# Patient Record
Sex: FEMALE | Race: BLACK OR AFRICAN AMERICAN | NOT HISPANIC OR LATINO | Employment: FULL TIME | URBAN - METROPOLITAN AREA
[De-identification: names, ages, dates, MRNs, and addresses within clinical notes are randomized per-mention and may not be internally consistent; named-entity substitution may affect disease eponyms.]

---

## 2024-07-10 ENCOUNTER — APPOINTMENT (OUTPATIENT)
Dept: RADIOLOGY | Facility: HOSPITAL | Age: 34
End: 2024-07-10

## 2024-07-10 ENCOUNTER — HOSPITAL ENCOUNTER (EMERGENCY)
Facility: HOSPITAL | Age: 34
Discharge: HOME | End: 2024-07-10
Attending: STUDENT IN AN ORGANIZED HEALTH CARE EDUCATION/TRAINING PROGRAM

## 2024-07-10 VITALS
HEART RATE: 71 BPM | WEIGHT: 200.84 LBS | RESPIRATION RATE: 18 BRPM | SYSTOLIC BLOOD PRESSURE: 125 MMHG | TEMPERATURE: 98.2 F | OXYGEN SATURATION: 98 % | DIASTOLIC BLOOD PRESSURE: 78 MMHG

## 2024-07-10 DIAGNOSIS — R10.84 ABDOMINAL PAIN, GENERALIZED: ICD-10-CM

## 2024-07-10 DIAGNOSIS — O21.9 NAUSEA AND VOMITING IN PREGNANCY (HHS-HCC): Primary | ICD-10-CM

## 2024-07-10 DIAGNOSIS — N39.0 ACUTE UTI: ICD-10-CM

## 2024-07-10 LAB
ALBUMIN SERPL BCP-MCNC: 4.9 G/DL (ref 3.4–5)
ALP SERPL-CCNC: 92 U/L (ref 33–110)
ALT SERPL W P-5'-P-CCNC: 14 U/L (ref 7–45)
ANION GAP SERPL CALC-SCNC: 16 MMOL/L (ref 10–20)
APPEARANCE UR: CLEAR
AST SERPL W P-5'-P-CCNC: 33 U/L (ref 9–39)
B-HCG SERPL-ACNC: ABNORMAL MIU/ML
BACTERIA #/AREA URNS AUTO: ABNORMAL /HPF
BASOPHILS # BLD AUTO: 0.04 X10*3/UL (ref 0–0.1)
BASOPHILS NFR BLD AUTO: 0.2 %
BILIRUB SERPL-MCNC: 0.7 MG/DL (ref 0–1.2)
BILIRUB UR STRIP.AUTO-MCNC: NEGATIVE MG/DL
BUN SERPL-MCNC: 11 MG/DL (ref 6–23)
CALCIUM SERPL-MCNC: 9.5 MG/DL (ref 8.6–10.3)
CHLORIDE SERPL-SCNC: 98 MMOL/L (ref 98–107)
CO2 SERPL-SCNC: 24 MMOL/L (ref 21–32)
COLOR UR: YELLOW
CREAT SERPL-MCNC: 0.63 MG/DL (ref 0.5–1.05)
EGFRCR SERPLBLD CKD-EPI 2021: >90 ML/MIN/1.73M*2
EOSINOPHIL # BLD AUTO: 0.04 X10*3/UL (ref 0–0.7)
EOSINOPHIL NFR BLD AUTO: 0.2 %
ERYTHROCYTE [DISTWIDTH] IN BLOOD BY AUTOMATED COUNT: 14.1 % (ref 11.5–14.5)
GLUCOSE SERPL-MCNC: 84 MG/DL (ref 74–99)
GLUCOSE UR STRIP.AUTO-MCNC: ABNORMAL MG/DL
HCG UR QL IA.RAPID: POSITIVE
HCT VFR BLD AUTO: 45 % (ref 36–46)
HGB BLD-MCNC: 14.6 G/DL (ref 12–16)
IMM GRANULOCYTES # BLD AUTO: 0.09 X10*3/UL (ref 0–0.7)
IMM GRANULOCYTES NFR BLD AUTO: 0.5 % (ref 0–0.9)
KETONES UR STRIP.AUTO-MCNC: ABNORMAL MG/DL
LEUKOCYTE ESTERASE UR QL STRIP.AUTO: ABNORMAL
LYMPHOCYTES # BLD AUTO: 3.06 X10*3/UL (ref 1.2–4.8)
LYMPHOCYTES NFR BLD AUTO: 16.2 %
MCH RBC QN AUTO: 26.4 PG (ref 26–34)
MCHC RBC AUTO-ENTMCNC: 32.4 G/DL (ref 32–36)
MCV RBC AUTO: 81 FL (ref 80–100)
MONOCYTES # BLD AUTO: 0.77 X10*3/UL (ref 0.1–1)
MONOCYTES NFR BLD AUTO: 4.1 %
MUCOUS THREADS #/AREA URNS AUTO: ABNORMAL /LPF
NEUTROPHILS # BLD AUTO: 14.93 X10*3/UL (ref 1.2–7.7)
NEUTROPHILS NFR BLD AUTO: 78.8 %
NITRITE UR QL STRIP.AUTO: NEGATIVE
NRBC BLD-RTO: 0 /100 WBCS (ref 0–0)
PH UR STRIP.AUTO: 6 [PH]
PLATELET # BLD AUTO: 296 X10*3/UL (ref 150–450)
POTASSIUM SERPL-SCNC: 5.4 MMOL/L (ref 3.5–5.3)
POTASSIUM SERPL-SCNC: 6.8 MMOL/L (ref 3.5–5.3)
PROT SERPL-MCNC: 7.9 G/DL (ref 6.4–8.2)
PROT UR STRIP.AUTO-MCNC: ABNORMAL MG/DL
RBC # BLD AUTO: 5.53 X10*6/UL (ref 4–5.2)
RBC # UR STRIP.AUTO: NEGATIVE /UL
RBC #/AREA URNS AUTO: ABNORMAL /HPF
SODIUM SERPL-SCNC: 131 MMOL/L (ref 136–145)
SP GR UR STRIP.AUTO: 1.04
SQUAMOUS #/AREA URNS AUTO: ABNORMAL /HPF
UROBILINOGEN UR STRIP.AUTO-MCNC: ABNORMAL MG/DL
WBC # BLD AUTO: 18.9 X10*3/UL (ref 4.4–11.3)
WBC #/AREA URNS AUTO: ABNORMAL /HPF

## 2024-07-10 PROCEDURE — 99284 EMERGENCY DEPT VISIT MOD MDM: CPT | Mod: 25

## 2024-07-10 PROCEDURE — 87086 URINE CULTURE/COLONY COUNT: CPT | Mod: AHULAB | Performed by: EMERGENCY MEDICINE

## 2024-07-10 PROCEDURE — 84132 ASSAY OF SERUM POTASSIUM: CPT | Performed by: EMERGENCY MEDICINE

## 2024-07-10 PROCEDURE — 76801 OB US < 14 WKS SINGLE FETUS: CPT

## 2024-07-10 PROCEDURE — 36415 COLL VENOUS BLD VENIPUNCTURE: CPT | Performed by: EMERGENCY MEDICINE

## 2024-07-10 PROCEDURE — 96361 HYDRATE IV INFUSION ADD-ON: CPT

## 2024-07-10 PROCEDURE — 81025 URINE PREGNANCY TEST: CPT | Performed by: EMERGENCY MEDICINE

## 2024-07-10 PROCEDURE — 85025 COMPLETE CBC W/AUTO DIFF WBC: CPT | Performed by: EMERGENCY MEDICINE

## 2024-07-10 PROCEDURE — 2500000004 HC RX 250 GENERAL PHARMACY W/ HCPCS (ALT 636 FOR OP/ED): Performed by: EMERGENCY MEDICINE

## 2024-07-10 PROCEDURE — 96375 TX/PRO/DX INJ NEW DRUG ADDON: CPT

## 2024-07-10 PROCEDURE — 84702 CHORIONIC GONADOTROPIN TEST: CPT | Performed by: EMERGENCY MEDICINE

## 2024-07-10 PROCEDURE — 96374 THER/PROPH/DIAG INJ IV PUSH: CPT

## 2024-07-10 PROCEDURE — 76801 OB US < 14 WKS SINGLE FETUS: CPT | Performed by: STUDENT IN AN ORGANIZED HEALTH CARE EDUCATION/TRAINING PROGRAM

## 2024-07-10 PROCEDURE — 76817 TRANSVAGINAL US OBSTETRIC: CPT | Performed by: STUDENT IN AN ORGANIZED HEALTH CARE EDUCATION/TRAINING PROGRAM

## 2024-07-10 PROCEDURE — 81001 URINALYSIS AUTO W/SCOPE: CPT | Performed by: EMERGENCY MEDICINE

## 2024-07-10 PROCEDURE — 80053 COMPREHEN METABOLIC PANEL: CPT | Performed by: EMERGENCY MEDICINE

## 2024-07-10 RX ORDER — ONDANSETRON 4 MG/1
4 TABLET, FILM COATED ORAL EVERY 6 HOURS
Qty: 12 TABLET | Refills: 0 | Status: SHIPPED | OUTPATIENT
Start: 2024-07-10 | End: 2024-07-13

## 2024-07-10 RX ORDER — DIPHENHYDRAMINE HYDROCHLORIDE 50 MG/ML
25 INJECTION INTRAMUSCULAR; INTRAVENOUS ONCE
Status: COMPLETED | OUTPATIENT
Start: 2024-07-10 | End: 2024-07-10

## 2024-07-10 RX ORDER — METOCLOPRAMIDE HYDROCHLORIDE 5 MG/ML
10 INJECTION INTRAMUSCULAR; INTRAVENOUS ONCE
Status: COMPLETED | OUTPATIENT
Start: 2024-07-10 | End: 2024-07-10

## 2024-07-10 RX ORDER — DOXYLAMINE SUCCINATE AND PYRIDOXINE HYDROCHLORIDE, DELAYED RELEASE TABLETS 10 MG/10 MG 10; 10 MG/1; MG/1
1 TABLET, DELAYED RELEASE ORAL DAILY
Qty: 30 TABLET | Refills: 0 | Status: SHIPPED | OUTPATIENT
Start: 2024-07-10

## 2024-07-10 RX ORDER — CEPHALEXIN 500 MG/1
500 CAPSULE ORAL 2 TIMES DAILY
Qty: 10 CAPSULE | Refills: 0 | Status: SHIPPED | OUTPATIENT
Start: 2024-07-10 | End: 2024-07-15

## 2024-07-10 ASSESSMENT — PAIN SCALES - GENERAL: PAINLEVEL_OUTOF10: 7

## 2024-07-10 ASSESSMENT — COLUMBIA-SUICIDE SEVERITY RATING SCALE - C-SSRS
2. HAVE YOU ACTUALLY HAD ANY THOUGHTS OF KILLING YOURSELF?: NO
6. HAVE YOU EVER DONE ANYTHING, STARTED TO DO ANYTHING, OR PREPARED TO DO ANYTHING TO END YOUR LIFE?: NO
1. IN THE PAST MONTH, HAVE YOU WISHED YOU WERE DEAD OR WISHED YOU COULD GO TO SLEEP AND NOT WAKE UP?: NO

## 2024-07-10 ASSESSMENT — PAIN - FUNCTIONAL ASSESSMENT: PAIN_FUNCTIONAL_ASSESSMENT: 0-10

## 2024-07-10 NOTE — ED TRIAGE NOTES
TRIAGE NOTE   I saw the patient as the Clinician in Triage and performed a brief history and physical exam, established acuity, and ordered appropriate tests to develop basic plan of care. Patient will be seen by an [facility], resident and/or physician who will independently evaluate the patient. Please see subsequent provider notes for further details and disposition.     Brief HPI: Chief complaint: Abdominal pain, headache, vomiting    History of present illness: Patient is a 34-year-old female approximately 6 weeks pregnant presenting to the emergency department with complaints of abdominal discomfort nausea and vomiting.  According to the patient, she has been having symptoms over the past 2 days.  The patient states that during the past 24 hours she has had a headache.  The patient denies any vaginal discharge or bleeding.  Patient states that she is not established with an OB/GYN regarding this pregnancy yet.  She denies any fever with this.  She denies any recent injury.  Concern, the patient presents to the emergency department for further evaluation.    Focused Physical exam:   Physical examination: General: Patient is an age-appropriate well-appearing female resting comfortably in the examination table  Cardiovascular: Patient has a regular rate and rhythm  Lungs: Lungs are clear to auscultation bilaterally  Abdomen: Patient has diffuse tenderness to palpation throughout her abdomen there is no guarding there is no rebound tenderness patient has normal bowel sounds  Neuro: Patient is alert she moves all 4 extremities spontaneously there are no lateralizing neurologic deficits cranial nerves III through XII are intact.    Plan/MDM:   Medical decision making: Patient presents to the emergency department with complaints of abdominal pain in the setting of pregnancy.  I placed orders for the patient as well as give the patient a dose of Reglan and Benadryl for both nausea as well as headache control.    Please see  subsequent provider note for further details and disposition

## 2024-07-10 NOTE — ED TRIAGE NOTES
PT PRESENTS TO ED 6 WKS PREGNANT W C/O CENTRAL ABDOMINAL PAIN AND N/V FOR THE LAST 2 DAYS. PT IS . PT STATES SHE HAS A HA. PT DENIES VAGINAL BLEEDING, SOB, DIZZINESS. PT HAS A HX OF HTN BUT DOES NOT TAKE MEDICATIONS FOR IT.

## 2024-07-11 LAB — HOLD SPECIMEN: NORMAL

## 2024-07-11 NOTE — DISCHARGE INSTRUCTIONS
You have been seen at a [facility].  Please follow-up with your primary care provider in the next 1 to 2 days for further evaluation and routine follow-up.  Please return to the emergency room if having any worsening symptoms.  Please follow-up with any specialists if discussed during your emergency room stay.

## 2024-07-11 NOTE — ED PROVIDER NOTES
EMERGENCY MEDICINE EVALUATION NOTE    History of Present Illness     Chief Complaint:   Chief Complaint   Patient presents with    Abdominal Pain       HPI: Karely Sharp is a 34 y.o. female who presents with complaint of abdominal pain.  Patient states she is currently around 6 weeks pregnant dated by last menstrual period and has not had a definitive ultrasound yet.  States for the past 2 days she has had intractable nausea and vomiting mainly during the mornings as well as some mild generalized abdominal pain and discomfort with a cramping sensation.  She denies any dysuria, hematuria, change in bowel movements, vaginal bleeding or discharge.  She states this is her 6 pregnancy and denies any complications with her other pregnancies.    Previous History   No past medical history on file.  No past surgical history on file.     No family history on file.  No Known Allergies  Current Outpatient Medications   Medication Instructions    cephalexin (KEFLEX) 500 mg, oral, 2 times daily    doxylamine-pyridoxine, vit B6, 10-10 mg tablet,delayed release (DR/EC) 1 tablet, oral, Daily    ondansetron (ZOFRAN) 4 mg, oral, Every 6 hours       Physical Exam     Appearance: Alert, oriented , cooperative,  in acute distress. Well nourished & well hydrated.     Skin: Intact,  dry skin, no lesions, rash, petechiae or purpura.      Eyes: PERRLA, EOMs intact,  Conjunctiva pink with no redness or exudates. Cornea & anterior chamber are clear, Eyelids without lesions. No scleral icterus.      ENT: Hearing grossly intact. External auditory canals patent, tympanic membranes intact with visible landmarks. Nares patent, mucus membranes moist. Dentition without lesions. Pharynx clear, uvula midline.      Neck: Supple, without meningismus. Thyroid not palpable. Trachea at midline. No lymphadenopathy.     Pulmonary: Clear bilaterally with good chest wall excursion. No rales, rhonchi or wheezing. No accessory muscle use or stridor.      Cardiac: Normal S1, S2 without murmur, rub, gallop or extrasystole. No JVD, Carotids without bruits.     Abdomen: Soft, mild generalized abdominal tenderness, active bowel sounds.  No palpable organomegaly.  No rebound or guarding.  No CVA tenderness.     Genitourinary: Exam deferred.     Musculoskeletal: Full range of motion. no pain, edema, or deformity. Pulses full and equal. No cyanosis or clubbing.      Neurological:  Cranial nerves II through XII are grossly intact, finger-nose touch is normal, normal sensation, no weakness, no focal findings identified.     Psychiatric: Appropriate mood and affect.      Results     Labs Reviewed   HCG, URINE, QUALITATIVE - Abnormal       Result Value    HCG, Urine POSITIVE (*)    URINALYSIS WITH REFLEX CULTURE AND MICROSCOPIC - Abnormal    Color, Urine Yellow      Appearance, Urine Clear      Specific Gravity, Urine 1.042 (*)     pH, Urine 6.0      Protein, Urine 30 (1+) (*)     Glucose, Urine 30 (TRACE) (*)     Blood, Urine NEGATIVE      Ketones, Urine 80 (3+) (*)     Bilirubin, Urine NEGATIVE      Urobilinogen, Urine 2 (1+) (*)     Nitrite, Urine NEGATIVE      Leukocyte Esterase, Urine 25 Steffi/µL (*)    CBC WITH AUTO DIFFERENTIAL - Abnormal    WBC 18.9 (*)     nRBC 0.0      RBC 5.53 (*)     Hemoglobin 14.6      Hematocrit 45.0      MCV 81      MCH 26.4      MCHC 32.4      RDW 14.1      Platelets 296      Neutrophils % 78.8      Immature Granulocytes %, Automated 0.5      Lymphocytes % 16.2      Monocytes % 4.1      Eosinophils % 0.2      Basophils % 0.2      Neutrophils Absolute 14.93 (*)     Immature Granulocytes Absolute, Automated 0.09      Lymphocytes Absolute 3.06      Monocytes Absolute 0.77      Eosinophils Absolute 0.04      Basophils Absolute 0.04     COMPREHENSIVE METABOLIC PANEL - Abnormal    Glucose 84      Sodium 131 (*)     Potassium 6.8 (*)     Chloride 98      Bicarbonate 24      Anion Gap 16      Urea Nitrogen 11      Creatinine 0.63      eGFR >90       Calcium 9.5      Albumin 4.9      Alkaline Phosphatase 92      Total Protein 7.9      AST 33      Bilirubin, Total 0.7      ALT 14     HUMAN CHORIONIC GONADOTROPIN, SERUM QUANTITATIVE - Abnormal    HCG, Beta-Quantitative 38,402 (*)     Narrative:      Total HCG measurement is performed using the [employer] [place] [employer]   Immunoassay which detects intact HCG and free beta HCG subunit.    This test is not indicated for use as a tumor marker.   HCG testing is performed using a different test methodology at [facility] than other [facility]. Direct result comparison   should only be made within the same method.       MICROSCOPIC ONLY, URINE - Abnormal    WBC, Urine 1-5      RBC, Urine 6-10 (*)     Squamous Epithelial Cells, Urine 10-25 (FEW)      Bacteria, Urine 1+ (*)     Mucus, Urine 4+     POTASSIUM - Abnormal    Potassium 5.4 (*)    URINE CULTURE   URINALYSIS WITH REFLEX CULTURE AND MICROSCOPIC    Narrative:     The following orders were created for panel order Urinalysis with Reflex Culture and Microscopic.  Procedure                               Abnormality         Status                     ---------                               -----------         ------                     Urinalysis with Reflex C...[[id number]]  Abnormal            Final result               Extra Urine Gray Tube[[id number]]                            In process                   Please view results for these tests on the individual orders.   EXTRA URINE GRAY TUBE     US PELVIS OB TRANSABDOMINAL W TRANSVAGINAL UP TO 1ST TRIMESTER   Final Result   Single live intrauterine pregnancy with gestational age of 6 weeks, 2   days based on crown-rump length.        Routine fetal anatomic survey ultrasound is recommended between 18   and 20 weeks gestational age.        Multiple uterine fibroids measuring up to 1.5 cm in the right aspect   of the uterus.        MACRO:   None.        Signed by: Mohit Holloway 7/10/2024 7:42 PM    Dictation workstation:   [id number]            ED Course & Medical Decision Making     Medications   sodium chloride 0.9 % bolus 1,000 mL (1,000 mL intravenous New Bag 7/10/24 2047)   metoclopramide (Reglan) injection 10 mg (10 mg intravenous Given 7/10/24 2047)   diphenhydrAMINE (BENADryl) injection 25 mg (25 mg intravenous Given 7/10/24 2048)     Diagnoses as of 07/10/24 2132   Nausea and vomiting in pregnancy ([facility])   Abdominal pain, generalized   Acute UTI     Heart Rate:  [68]   Temperature:  [36.8 °C (98.2 °F)]   Respirations:  [16]   BP: (132)/(84)   Weight:  [91.1 kg (200 lb 13.4 oz)]   Pulse Ox:  [100 %]      Patient's vital signs otherwise reassuring on initial examination.  She does have minimal generalized tenderness.  Low suspicion for active miscarriage given history although considered.  Also considered ovarian torsion however no other pelvic or intra-abdominal pathology.  Likely related to hyperemesis gravidarum or morning sickness.  She was given 1 L normal saline as well as Benadryl and Reglan initially.  Lab work did show leukocytosis of 18.9 likely from current pregnancy.  Potassium was hemolyzed at 6.8 and repeat still hemolyzed at 5.4 and likely normal without need for emergent repeat.  Mild hyponatremia 131.  Otherwise normal renal function.  Beta-hCG within appropriate limits and glucose normal.  Urinalysis with bacteria noted and possible underlying urinary tract infection.  Pelvic ultrasound was completed which did show a live intrauterine pregnancy dating around 6 weeks and 2 days with multiple uterine fibroids which could be the cause of her pain although unlikely.  Likely morning sickness.  Otherwise patient was able to tolerate oral intake with significant improvement reexamination.  She will be discharged with additional Zofran and likely just for home use.  Also given 5 days of Keflex.    Procedures   Procedures    Diagnosis     1. Nausea and vomiting in pregnancy ([facility])     2. Abdominal pain, generalized    3. Acute UTI        Disposition     DISCHARGE.  The patient is discharged back to their place of residence.  Discharge diagnosis, instructions and plan were discussed and understood. At the time of discharge the patient was comfortable and was in no apparent distress. Patient is aware of diagnostic uncertainty and was notified though testing is negative here, there is a very small chance that pathology may be missed.  The patient understands these risks and the patient /family understood to return immediately to the emergency department if the symptoms worsen or if they have any additional concerns.    FOLLOW UP  Primary care provider in 1-2 days.        ED Prescriptions       Medication Sig Dispense Start Date End Date Auth. Provider    ondansetron (Zofran) 4 mg tablet Take 1 tablet (4 mg) by mouth every 6 hours for 3 days. 12 tablet 7/10/2024 7/13/2024 Junito Brooks DO    doxylamine-pyridoxine, vit B6, 10-10 mg tablet,delayed release (DR/EC) Take 1 tablet by mouth once daily. 30 tablet 7/10/2024 -- Junito Brooks DO    cephalexin (Keflex) 500 mg capsule Take 1 capsule (500 mg) by mouth 2 times a day for 5 days. 10 capsule 7/10/2024 7/15/2024 DO Junito Claudio DO  07/10/24 2134

## 2024-07-12 LAB — BACTERIA UR CULT: NORMAL

## 2024-09-16 ENCOUNTER — APPOINTMENT (OUTPATIENT)
Dept: OBSTETRICS AND GYNECOLOGY | Facility: CLINIC | Age: 34
End: 2024-09-16

## 2024-09-16 VITALS
DIASTOLIC BLOOD PRESSURE: 85 MMHG | HEIGHT: 63 IN | WEIGHT: 233 LBS | BODY MASS INDEX: 41.29 KG/M2 | SYSTOLIC BLOOD PRESSURE: 141 MMHG

## 2024-09-16 DIAGNOSIS — Z3A.16 16 WEEKS GESTATION OF PREGNANCY (HHS-HCC): Primary | ICD-10-CM

## 2024-09-16 DIAGNOSIS — E66.01 SEVERE OBESITY (BMI >= 40) (MULTI): ICD-10-CM

## 2024-09-16 DIAGNOSIS — A59.9 TRICHOMONAS INFECTION: ICD-10-CM

## 2024-09-16 DIAGNOSIS — Z34.82 SUPERVISION OF NORMAL INTRAUTERINE PREGNANCY IN MULTIGRAVIDA, SECOND TRIMESTER (HHS-HCC): ICD-10-CM

## 2024-09-16 PROBLEM — D11.0 BENIGN NEOPLASM OF PAROTID GLAND: Status: ACTIVE | Noted: 2024-09-16

## 2024-09-16 PROBLEM — E66.9 OBESITY (BMI 30-39.9): Status: RESOLVED | Noted: 2024-09-16 | Resolved: 2024-09-16

## 2024-09-16 PROCEDURE — 88175 CYTOPATH C/V AUTO FLUID REDO: CPT

## 2024-09-16 PROCEDURE — 87491 CHLMYD TRACH DNA AMP PROBE: CPT

## 2024-09-16 PROCEDURE — 87661 TRICHOMONAS VAGINALIS AMPLIF: CPT

## 2024-09-16 PROCEDURE — 87591 N.GONORRHOEAE DNA AMP PROB: CPT

## 2024-09-16 PROCEDURE — 87624 HPV HI-RISK TYP POOLED RSLT: CPT

## 2024-09-16 RX ORDER — NAPROXEN SODIUM 220 MG/1
81 TABLET, FILM COATED ORAL 2 TIMES DAILY
Qty: 60 TABLET | Refills: 5 | Status: SHIPPED | OUTPATIENT
Start: 2024-09-16 | End: 2025-09-16

## 2024-09-16 SDOH — ECONOMIC STABILITY: FOOD INSECURITY: WITHIN THE PAST 12 MONTHS, THE FOOD YOU BOUGHT JUST DIDN'T LAST AND YOU DIDN'T HAVE MONEY TO GET MORE.: NEVER TRUE

## 2024-09-16 SDOH — ECONOMIC STABILITY: TRANSPORTATION INSECURITY
IN THE PAST 12 MONTHS, HAS THE LACK OF TRANSPORTATION KEPT YOU FROM MEDICAL APPOINTMENTS OR FROM GETTING MEDICATIONS?: YES

## 2024-09-16 SDOH — ECONOMIC STABILITY: FOOD INSECURITY: WITHIN THE PAST 12 MONTHS, YOU WORRIED THAT YOUR FOOD WOULD RUN OUT BEFORE YOU GOT MONEY TO BUY MORE.: NEVER TRUE

## 2024-09-16 SDOH — ECONOMIC STABILITY: TRANSPORTATION INSECURITY
IN THE PAST 12 MONTHS, HAS LACK OF TRANSPORTATION KEPT YOU FROM MEETINGS, WORK, OR FROM GETTING THINGS NEEDED FOR DAILY LIVING?: YES

## 2024-09-16 ASSESSMENT — SOCIAL DETERMINANTS OF HEALTH (SDOH)
WITHIN THE LAST YEAR, HAVE YOU BEEN KICKED, HIT, SLAPPED, OR OTHERWISE PHYSICALLY HURT BY YOUR PARTNER OR EX-PARTNER?: NO
WITHIN THE LAST YEAR, HAVE YOU BEEN AFRAID OF YOUR PARTNER OR EX-PARTNER?: NO
WITHIN THE LAST YEAR, HAVE YOU BEEN HUMILIATED OR EMOTIONALLY ABUSED IN OTHER WAYS BY YOUR PARTNER OR EX-PARTNER?: NO
WITHIN THE LAST YEAR, HAVE TO BEEN RAPED OR FORCED TO HAVE ANY KIND OF SEXUAL ACTIVITY BY YOUR PARTNER OR EX-PARTNER?: NO

## 2024-09-16 ASSESSMENT — ENCOUNTER SYMPTOMS
OCCASIONAL FEELINGS OF UNSTEADINESS: 0
DEPRESSION: 0
LOSS OF SENSATION IN FEET: 0

## 2024-09-16 ASSESSMENT — EDINBURGH POSTNATAL DEPRESSION SCALE (EPDS)
I HAVE BEEN ANXIOUS OR WORRIED FOR NO GOOD REASON: YES, SOMETIMES
I HAVE BLAMED MYSELF UNNECESSARILY WHEN THINGS WENT WRONG: NOT VERY OFTEN
THE THOUGHT OF HARMING MYSELF HAS OCCURRED TO ME: NEVER
I HAVE LOOKED FORWARD WITH ENJOYMENT TO THINGS: AS MUCH AS I EVER DID
I HAVE FELT SCARED OR PANICKY FOR NO GOOD REASON: NO, NOT MUCH
I HAVE BEEN SO UNHAPPY THAT I HAVE HAD DIFFICULTY SLEEPING: NOT VERY OFTEN
I HAVE BEEN SO UNHAPPY THAT I HAVE BEEN CRYING: NO, NEVER
I HAVE FELT SAD OR MISERABLE: NOT VERY OFTEN
I HAVE BEEN ABLE TO LAUGH AND SEE THE FUNNY SIDE OF THINGS: AS MUCH AS I ALWAYS COULD
THINGS HAVE BEEN GETTING ON TOP OF ME: NO, MOST OF THE TIME I HAVE COPED QUITE WELL
TOTAL SCORE: 7

## 2024-09-16 NOTE — PROGRESS NOTES
Subjective   Patient ID [id number]   Karely Sharp is a 34 y.o.  at 16w0d with a working estimated date of delivery of 3/3/2025, by Ultrasound who presents for an initial prenatal visit. This pregnancy is unplanned.  Here with her partner.  Reports she does not get medical health care in general and has never had a pap smear.    OB History    Para Term  AB Living   5 1 1 0 3 1   SAB IAB Ectopic Multiple Live Births   0 3 0 0 1      # Outcome Date GA Lbr Bret/2nd Weight Sex Type Anes PTL Lv   5 Current            4 Term 13    M Vag-Spont   SHANAE   3 IAB            2 IAB            1 IAB               Obstetric Comments   Patient reports having to do some type of 24-hour urine test with her pregnancy in .  Does not remember having a diagnosis of preeclampsia.     [place]  Depression Scale Total: 7    Objective   Physical Exam  Weight: 106 kg (233 lb)  Expected Total Weight Gain: 5 kg (11 lb)-9 kg (19 lb)   Pregravid BMI: 41.28  BP: 141/85    Fetal Heart Rate: 156     OBGyn Exam see prenatal physical       ASSESSMENT & PLAN    Karely Sharp is a 34 y.o.  at 16w0d here for the following concerns we addressed today:    Problem List Items Addressed This Visit       16 weeks gestation of pregnancy ([facility]) - Primary    Overview     Desired provider in labor: [] CNM  [] Physician  [] Blood Products: [] Yes, accepts [] No, needs counseling  [x] Initial BMI: 41.28   [] Prenatal Labs: pending   [] Cervical Cancer Screening up to date: pending  [] Rh status:   [] Genetic Screening:  pending  [] NT US: (11-13 wks)  [x] Baby ASA (if indicated): yes  [x] Pregnancy dated by: us er    [] Anatomy US: (19-20 wks)  [] Federal Sterilization consent signed (if indicated):  [] 1hr GCT at 24-28wks:  [] Rhogam (if indicated):   [] Fetal Surveillance (if indicated):  [] Tdap (27-32 wks, may be given up to 36 wks if initial window missed):   [] RSV (32-36 wks) (Sept. to end of ):   []  Flu Vaccine:    [] Breastfeeding:  [] Postpartum Birth control method:   [] GBS at 36 - 37 wks:  [] 39 weeks discussion of IOL vs. Expectant management:  [] Mode of delivery ( anticipated ):          Relevant Medications    aspirin 81 mg chewable tablet    prenatal vitamin calcium-iron-folic 27 mg iron- 1 mg tablet    Other Relevant Orders    US MAC OB imaging order    Hemoglobin A1C    Type And Screen    CBC Anemia Panel With Reflex,Pregnancy    Hemoglobin Identification with Path Review    Hepatitis C Antibody    Hepatitis B Surface Antigen    Rubella Antibody, IgG    Syphilis Screen with Reflex    HIV 1/2 Antigen/Antibody Screen with Reflex to Confirmation    Urine Culture    THINPREP PAP TEST    [facility] Prequel Prenatal Screen    Severe obesity (BMI >= 40) (Multi)     Other Visit Diagnoses       Supervision of normal intrauterine pregnancy in multigravida, second trimester ([facility])                  Orders Placed This Encounter   Procedures    Urine Culture     Order Specific Question:   Release result to MyChart     Answer:   Immediate [1]    [facility] OB imaging order     Standing Status:   Future     Standing Expiration Date:   9/16/2025     Order Specific Question:   Reason for exam:     Answer:   pregnancy     Order Specific Question:   Radiologist to Determine Optimal Study     Answer:   Yes     Order Specific Question:   Release result to MyChart     Answer:   Immediate [1]     Order Specific Question:   Is this exam part of a Research Study? If Yes, link this order to the research study     Answer:   No    Hemoglobin A1C     Standing Status:   Future     Standing Expiration Date:   10/16/2024     Order Specific Question:   Release result to MyChart     Answer:   Immediate [1]    Type And Screen     Standing Status:   Future     Standing Expiration Date:   10/16/2024     Order Specific Question:   Release result to MyChart     Answer:   Immediate [1]    CBC Anemia Panel With Reflex,Pregnancy     Standing  Status:   Future     Standing Expiration Date:   10/16/2024     Order Specific Question:   Release result to MyChart     Answer:   Immediate [1]    Hemoglobin Identification with Path Review     Standing Status:   Future     Standing Expiration Date:   10/16/2024     Order Specific Question:   Release result to MyChart     Answer:   Immediate [1]    Hepatitis C Antibody     Standing Status:   Future     Standing Expiration Date:   10/16/2024     Order Specific Question:   Release result to MyChart     Answer:   Immediate [1]    Hepatitis B Surface Antigen     Standing Status:   Future     Standing Expiration Date:   10/16/2024     Order Specific Question:   Release result to MyChart     Answer:   Immediate [1]    Rubella Antibody, IgG     Standing Status:   Future     Standing Expiration Date:   10/16/2024     Order Specific Question:   Release result to MyChart     Answer:   Immediate [1]    Syphilis Screen with Reflex     Standing Status:   Future     Standing Expiration Date:   10/16/2024     Order Specific Question:   Release result to MyChart     Answer:   Immediate [1]    HIV 1/2 Antigen/Antibody Screen with Reflex to Confirmation     Standing Status:   Future     Standing Expiration Date:   10/16/2024     Order Specific Question:   Release result to MyChart     Answer:   Immediate [1]    [facility] Prequel Prenatal Screen     Standing Status:   Future     Number of Occurrences:   1     Standing Expiration Date:   9/16/2025     Order Specific Question:   Patient Ethnicity     Answer:    or      Order Specific Question:   Pregnant     Answer:   Yes     Order Specific Question:   Due Date     Answer:   3/3/2025     Order Specific Question:   First Pregnancy     Answer:   No     Order Specific Question:   Pregnancy type     Answer:   [place] (or unknown)     Order Specific Question:   Is patient egg/sperm donor     Answer:   No     Order Specific Question:   Common aneuploidy, chromosome 13,  18, 21 (Z36.0)     Answer:   Yes     Order Specific Question:   Include sex chromosome analysis     Answer:   Yes     Order Specific Question:   Microdeletions, arellano only     Answer:   No     Order Specific Question:   Expanded aneuploidy, arellano only     Answer:   No     Order Specific Question:   Clinical indications     Answer:   Supervision of other normal pregnancy Z34.80, Z34.81, Z34.82, Z34.83     Order Specific Question:   Maternal height feet     Answer:   5     Order Specific Question:   Maternal height inches     Answer:   3     Order Specific Question:   Maternal weight in lbs     Answer:   233     Order Specific Question:   Was pregnancy conceived by assisted reproductive technology     Answer:   No     Order Specific Question:   Billing Information     Answer:   Bill to insurance - If possible, attach a copy of the patient's insurance card     Order Specific Question:   Relationship to Policy Owner     Answer:   Self     Order Specific Question:   Patient e-mail address     Answer:   [email or web address]     Order Specific Question:   Patient's phone number     Answer:   [phone]     Order Specific Question:   Authorized Representative     Answer:   By providing the below contact, I confirm that the patient has expressly consented to [facility] sharing the patients protected health information, including screening results and billing information, with the person listed upon request.        RTC in 4 weeks      Lillian Montague, DOV-CNP

## 2024-09-16 NOTE — LETTER
9/16/2024    To Whom It May Concern:    Karely Sharp is being followed for her pregnancy at [facility].  Estimated Date of Delivery: 3/3/25    Sincerely,        DOV Juarez-[facility]

## 2024-09-16 NOTE — LETTER
9/16/2024    To Whom It May Concern:    Karely Sharp is being followed for her pregnancy at [facility].  Estimated Date of Delivery: 2/7/25    Sincerely,        DOV Juarez-[facility]

## 2024-09-16 NOTE — LETTER
9/16/2024    To Whom It May Concern:    This is to certify that my patient,Karely Sharp is under my care for her pregnancy.    The following guidelines may be used for any dental work:  You may use a local anesthetic with or without Epinephrine.  You may prescribe any Penicillin or Cephalosporin if an antibiotic is necessary. (Dependent on allergy history)  You may take x-rays with a lead apron if necessary.  You may prescribe Tylenol and/or narcotics for pain.  You may extract teeth if necessary.    If you need further information or if you have any concerns, please contact our office.    Sincerely,        Lillian Montague, APRD[facility]EL[facility]

## 2024-09-16 NOTE — LETTER
9/16/2024    To Whom It May Concern:    This is to certify that my patient,Karely Sharp is under my care for her pregnancy.    The following guidelines may be used for any dental work:  You may use a local anesthetic with or without Epinephrine.  You may prescribe any Penicillin or Cephalosporin if an antibiotic is necessary. (Dependent on allergy history)  You may take x-rays with a lead apron if necessary.  You may prescribe Tylenol and/or narcotics for pain.  You may extract teeth if necessary.    If you need further information or if you have any concerns, please contact our office.    Sincerely,        Lillian Montague, APRDANIELUSMD [facility]

## 2024-09-18 ENCOUNTER — TELEPHONE (OUTPATIENT)
Dept: OBSTETRICS AND GYNECOLOGY | Facility: HOSPITAL | Age: 34
End: 2024-09-18
Payer: MEDICAID

## 2024-09-18 LAB
C TRACH RRNA SPEC QL NAA+PROBE: NEGATIVE
N GONORRHOEA DNA SPEC QL PROBE+SIG AMP: NEGATIVE
T VAGINALIS RRNA SPEC QL NAA+PROBE: POSITIVE

## 2024-09-18 RX ORDER — METRONIDAZOLE 500 MG/1
TABLET ORAL
Qty: 4 TABLET | Refills: 0 | Status: SHIPPED | OUTPATIENT
Start: 2024-09-18

## 2024-09-18 NOTE — TELEPHONE ENCOUNTER
----- Message from Lillian Rileys sent at 9/18/2024  2:03 PM EDT -----  Please contact pt and inform of infection.  Medication has been sent to pharmacy    Contacted patient to discuss results  Patient was identified by name and date of birth.   Patient informed that she tested positive for trichomonas, a sexually transmitted infection  Patient educated that she will need to be treated with an oral antibiotic (metronidazole 500mg) to take 4 tablets for one dose  Patient educated to not have sex until 7 days after treatment is completed and that her partner will need to be treated as well   EPT offered, patient declined  Patient verbalized understanding.  Encouraged patient to call office with any questions or concerns   Annie Kingsley RN

## 2024-09-19 ENCOUNTER — LAB (OUTPATIENT)
Dept: LAB | Facility: LAB | Age: 34
End: 2024-09-19
Payer: MEDICAID

## 2024-09-19 DIAGNOSIS — Z3A.16 16 WEEKS GESTATION OF PREGNANCY (HHS-HCC): ICD-10-CM

## 2024-09-19 DIAGNOSIS — Z34.82 SUPERVISION OF NORMAL INTRAUTERINE PREGNANCY IN MULTIGRAVIDA, SECOND TRIMESTER (HHS-HCC): ICD-10-CM

## 2024-09-19 LAB
ABO GROUP (TYPE) IN BLOOD: NORMAL
ANTIBODY SCREEN: NORMAL
ERYTHROCYTE [DISTWIDTH] IN BLOOD BY AUTOMATED COUNT: 13.9 % (ref 11.5–14.5)
EST. AVERAGE GLUCOSE BLD GHB EST-MCNC: 114 MG/DL
HBA1C MFR BLD: 5.6 %
HBV SURFACE AG SERPL QL IA: NONREACTIVE
HCT VFR BLD AUTO: 36.2 % (ref 36–46)
HCV AB SER QL: NONREACTIVE
HGB BLD-MCNC: 11.8 G/DL (ref 12–16)
HIV 1+2 AB+HIV1 P24 AG SERPL QL IA: NONREACTIVE
MCH RBC QN AUTO: 26.6 PG (ref 26–34)
MCHC RBC AUTO-ENTMCNC: 32.6 G/DL (ref 32–36)
MCV RBC AUTO: 82 FL (ref 80–100)
NRBC BLD-RTO: 0 /100 WBCS (ref 0–0)
PLATELET # BLD AUTO: 298 X10*3/UL (ref 150–450)
RBC # BLD AUTO: 4.43 X10*6/UL (ref 4–5.2)
REFLEX ADDED, ANEMIA PANEL: NORMAL
RH FACTOR (ANTIGEN D): NORMAL
WBC # BLD AUTO: 14.6 X10*3/UL (ref 4.4–11.3)

## 2024-09-19 PROCEDURE — 86900 BLOOD TYPING SEROLOGIC ABO: CPT

## 2024-09-19 PROCEDURE — 85027 COMPLETE CBC AUTOMATED: CPT

## 2024-09-19 PROCEDURE — 86780 TREPONEMA PALLIDUM: CPT

## 2024-09-19 PROCEDURE — 36415 COLL VENOUS BLD VENIPUNCTURE: CPT

## 2024-09-19 PROCEDURE — 86317 IMMUNOASSAY INFECTIOUS AGENT: CPT

## 2024-09-19 PROCEDURE — 86850 RBC ANTIBODY SCREEN: CPT

## 2024-09-19 PROCEDURE — 83036 HEMOGLOBIN GLYCOSYLATED A1C: CPT

## 2024-09-19 PROCEDURE — 83021 HEMOGLOBIN CHROMOTOGRAPHY: CPT

## 2024-09-19 PROCEDURE — 86803 HEPATITIS C AB TEST: CPT

## 2024-09-19 PROCEDURE — 86901 BLOOD TYPING SEROLOGIC RH(D): CPT

## 2024-09-19 PROCEDURE — 87086 URINE CULTURE/COLONY COUNT: CPT

## 2024-09-19 PROCEDURE — 87389 HIV-1 AG W/HIV-1&-2 AB AG IA: CPT

## 2024-09-19 PROCEDURE — 83020 HEMOGLOBIN ELECTROPHORESIS: CPT | Performed by: NURSE PRACTITIONER

## 2024-09-19 PROCEDURE — 87340 HEPATITIS B SURFACE AG IA: CPT

## 2024-09-20 LAB
BACTERIA UR CULT: NORMAL
HEMOGLOBIN A2: 2.9 % (ref 2–3.5)
HEMOGLOBIN A: 96.8 % (ref 95.8–98)
HEMOGLOBIN F: 0.3 % (ref 0–2)
HEMOGLOBIN IDENTIFICATION INTERPRETATION: NORMAL
PATH REVIEW-HGB IDENTIFICATION: NORMAL
TREPONEMA PALLIDUM IGG+IGM AB [PRESENCE] IN SERUM OR PLASMA BY IMMUNOASSAY: NONREACTIVE

## 2024-09-21 LAB
RUBV IGG SERPL IA-ACNC: 0.9 IA
RUBV IGG SERPL QL IA: NORMAL

## 2024-09-26 ENCOUNTER — HOSPITAL ENCOUNTER (OUTPATIENT)
Dept: RADIOLOGY | Facility: CLINIC | Age: 34
Discharge: HOME | End: 2024-09-26
Payer: MEDICAID

## 2024-09-26 DIAGNOSIS — O99.212 OBESITY COMPLICATING PREGNANCY, SECOND TRIMESTER (HHS-HCC): ICD-10-CM

## 2024-09-26 DIAGNOSIS — O36.80X0 PREGNANCY WITH INCONCLUSIVE FETAL VIABILITY, NOT APPLICABLE OR UNSPECIFIED: ICD-10-CM

## 2024-09-26 DIAGNOSIS — Z3A.16 16 WEEKS GESTATION OF PREGNANCY (HHS-HCC): ICD-10-CM

## 2024-09-26 PROCEDURE — 76815 OB US LIMITED FETUS(S): CPT

## 2024-09-30 LAB
CYTOLOGY CMNT CVX/VAG CYTO-IMP: NORMAL
HPV HR 12 DNA GENITAL QL NAA+PROBE: NEGATIVE
HPV HR GENOTYPES PNL CVX NAA+PROBE: NEGATIVE
HPV16 DNA SPEC QL NAA+PROBE: NEGATIVE
HPV18 DNA SPEC QL NAA+PROBE: NEGATIVE
LAB AP HPV GENOTYPE QUESTION: YES
LAB AP HPV HR: NORMAL
LAB AP PAP ADDITIONAL TESTS: NORMAL
LABORATORY COMMENT REPORT: NORMAL
LMP START DATE: NORMAL
MENSTRUAL HX REPORTED: NORMAL
PATH REPORT.TOTAL CANCER: NORMAL

## 2024-10-03 LAB
COMMENTS - MP RESULT TYPE: NORMAL
SCAN RESULT: NORMAL

## 2024-10-17 ENCOUNTER — APPOINTMENT (OUTPATIENT)
Dept: OBSTETRICS AND GYNECOLOGY | Facility: CLINIC | Age: 34
End: 2024-10-17
Payer: MEDICAID

## 2024-10-17 VITALS — WEIGHT: 244 LBS | BODY MASS INDEX: 43.22 KG/M2 | SYSTOLIC BLOOD PRESSURE: 138 MMHG | DIASTOLIC BLOOD PRESSURE: 83 MMHG

## 2024-10-17 DIAGNOSIS — A59.9 TRICHOMONAS INFECTION: Primary | ICD-10-CM

## 2024-10-17 DIAGNOSIS — E66.01 SEVERE OBESITY (BMI >= 40) (MULTI): ICD-10-CM

## 2024-10-17 DIAGNOSIS — Z71.85 VACCINE COUNSELING: ICD-10-CM

## 2024-10-17 DIAGNOSIS — Z3A.20 20 WEEKS GESTATION OF PREGNANCY (HHS-HCC): ICD-10-CM

## 2024-10-17 PROCEDURE — 87491 CHLMYD TRACH DNA AMP PROBE: CPT

## 2024-10-17 PROCEDURE — 87661 TRICHOMONAS VAGINALIS AMPLIF: CPT

## 2024-10-17 PROCEDURE — 87591 N.GONORRHOEAE DNA AMP PROB: CPT

## 2024-10-17 ASSESSMENT — ENCOUNTER SYMPTOMS
LOSS OF SENSATION IN FEET: 0
OCCASIONAL FEELINGS OF UNSTEADINESS: 0
DEPRESSION: 0

## 2024-10-17 NOTE — PROGRESS NOTES
Ob Visit  10/17/24     SUBJECTIVE      HPI: Karely Sharp is a 34 y.o.  at 20w3d here for RPNV.  Reports feeling fetal movement.  No contractions bleeding or loss of fluid.    OBJECTIVE  Visit Vitals  /83   Wt 111 kg (244 lb)   LMP 2024 (Approximate)   BMI 43.22 kg/m²   OB Status Pregnant   Smoking Status Never   BSA 2.22 m²            ASSESSMENT & PLAN    Karely Sharp is a 34 y.o.  at 20w3d here for the following concerns we addressed today:    Problem List Items Addressed This Visit          Ob-Gyn Problems    20 weeks gestation of pregnancy ([facility])    Overview     Desired provider in labor: [] CNM  [] Physician  [] Blood Products: [] Yes, accepts [] No, needs counseling  [x] Initial BMI: 41.28   [x] Prenatal Labs: Equivocal rubella  [x] Cervical Cancer Screening up to date: WNL  [x] Rh status: Rh+  [x] Genetic Screening:   [x] NT US: (11-13 wks) late screen  [x] Baby ASA (if indicated): yes  [x] Pregnancy dated by: us er    [] Anatomy US: (19-20 wks) pending  [] Federal Sterilization consent signed (if indicated):  [] 1hr GCT at 24-28wks:  [] Rhogam (if indicated):   [] Fetal Surveillance (if indicated):  [] Tdap (27-32 wks, may be given up to 36 wks if initial window missed):   [] RSV (32-36 wks) (Sept. to end of ):   [] Flu Vaccine:    [] Breastfeeding:  [] Postpartum Birth control method:   [] GBS at 36 - 37 wks:  [] 39 weeks discussion of IOL vs. Expectant management:  [] Mode of delivery ( anticipated ):             Other    Severe obesity (BMI >= 40) (Multi)    Overview     Will consider 39 week induction    Discussed current BMI of 43 with 11 pound weight gain in 4 weeks.  Informed of BMI guideline of 50 or above not being able to deliver at [facility] and would need to deliver at [facility].  Patient states understanding.  Encouraged patient to not restrict eating but to keep eating when she is hungry and to stop eating when she is full.    Anticipated NSTs and  additional ultrasounds for fetal growth          Other Visit Diagnoses       Trichomonas infection    -  Primary    Relevant Orders    C. trachomatis / N. gonorrhoeae, Amplified    Trichomonas vaginalis, Amplified    Vaccine counseling        Relevant Orders    Flu vaccine, trivalent, preservative free, age 6 months and greater (Fluarix/Fluzone/Flulaval) (Completed)              RTC in 4 weeks      Lillian Montague, APRN-CNP

## 2024-10-18 LAB
C TRACH RRNA SPEC QL NAA+PROBE: NEGATIVE
N GONORRHOEA DNA SPEC QL PROBE+SIG AMP: NEGATIVE
T VAGINALIS RRNA SPEC QL NAA+PROBE: NEGATIVE

## 2024-10-25 ENCOUNTER — HOSPITAL ENCOUNTER (OUTPATIENT)
Dept: RADIOLOGY | Facility: CLINIC | Age: 34
Discharge: HOME | End: 2024-10-25
Payer: MEDICAID

## 2024-10-25 DIAGNOSIS — Z3A.16 16 WEEKS GESTATION OF PREGNANCY (HHS-HCC): ICD-10-CM

## 2024-10-25 PROCEDURE — 76811 OB US DETAILED SNGL FETUS: CPT

## 2024-11-14 ENCOUNTER — APPOINTMENT (OUTPATIENT)
Dept: OBSTETRICS AND GYNECOLOGY | Facility: CLINIC | Age: 34
End: 2024-11-14
Payer: MEDICAID

## 2024-11-14 VITALS — BODY MASS INDEX: 44.11 KG/M2 | SYSTOLIC BLOOD PRESSURE: 138 MMHG | WEIGHT: 249 LBS | DIASTOLIC BLOOD PRESSURE: 88 MMHG

## 2024-11-14 DIAGNOSIS — E66.01 SEVERE OBESITY (BMI >= 40) (MULTI): ICD-10-CM

## 2024-11-14 DIAGNOSIS — Z3A.24 24 WEEKS GESTATION OF PREGNANCY (HHS-HCC): Primary | ICD-10-CM

## 2024-11-14 ASSESSMENT — ENCOUNTER SYMPTOMS
DEPRESSION: 0
LOSS OF SENSATION IN FEET: 0
OCCASIONAL FEELINGS OF UNSTEADINESS: 0

## 2024-11-14 NOTE — PROGRESS NOTES
Ob Visit  24     SUBJECTIVE      HPI: Karely Sharp is a 34 y.o.  at 24w3d here for RPNV.  She has no contractions, no bleeding, or LOF. Reports normal fetal movement. Patient reports occasionally feeling hot and dizzy.       OBJECTIVE  Visit Vitals  /88   Wt 113 kg (249 lb)   LMP 2024 (Approximate)   BMI 44.11 kg/m²   OB Status Pregnant   Smoking Status Never   BSA 2.24 m²            ASSESSMENT & PLAN    Karely Sharp is a 34 y.o.  at 24w3d here for the following concerns we addressed today:    Problem List Items Addressed This Visit          Ob-Gyn Problems    24 weeks gestation of pregnancy ([facility]) - Primary    Overview     Desired provider in labor: [] CNM  [] Physician  [x] Blood Products: [x] Yes, accepts [] No, needs counseling  [x] Initial BMI: 41.28   [x] Prenatal Labs: Equivocal rubella  [x] Cervical Cancer Screening up to date: WNL  [x] Rh status: Rh+  [x] Genetic Screening:   [x] NT US: (11-13 wks) late screen  [x] Baby ASA (if indicated): yes  [x] Pregnancy dated by: us er    [x] Anatomy US: (19-20 wks)    [] Federal Sterilization consent signed (if indicated):  [] 1hr GCT at 24-28wks: Orders pended  [] Rhogam (if indicated):   [] Fetal Surveillance (if indicated):  [] Tdap (27-32 wks, may be given up to 36 wks if initial window missed):   [] RSV (32-36 wks) (Sept. to end of ):   [] Flu Vaccine:    [] Breastfeeding:  [] Postpartum Birth control method:   [] GBS at 36 - 37 wks:  [] 39 weeks discussion of IOL vs. Expectant management:  [] Mode of delivery ( anticipated ):     Patient and her partner here today at 24 weeks gestational age.  Patient's partner reporting he has concerns with patient.  These include dizzy spells that she is reporting, lack of eating, and her job not following work restrictions which had been documented in the letter that she has provided to her work place.  Discussed due to patient's BMI that excessive weight gain is not  encouraged.  And she has indeed gained weight through the pregnancy thus far.  Encouraged increase p.o. hydration primarily water.  Patient reports feeling hot at various times and lightheaded and dizzy at that time.    Reiterated guidelines for work restrictions for patient to take back to her workplace.         Relevant Orders    Syphilis Screen with Reflex    CBC Anemia Panel With Reflex,Pregnancy    Glucose, 1 Hour Screen, Pregnancy    Comprehensive Metabolic Panel    Protein, Urine Random       Other    Severe obesity (BMI >= 40) (Multi)    Overview     Will consider 39 week induction    Discussed current BMI of 43 with 11 pound weight gain in 4 weeks.  Informed of BMI guideline of 50 or above not being able to deliver at [facility] and would need to deliver at [facility].  Patient states understanding.  Encouraged patient to not restrict eating but to keep eating when she is hungry and to stop eating when she is full.    Anticipated NSTs and additional ultrasounds for fetal growth    Patient reports she is not taking baby aspirin as directed.  Encouraged to do so as prescribed.              RTC in 2-3 weeks      Lillian Montague, APRN-CNP

## 2024-11-14 NOTE — LETTER
Date: 2024  RE:  Karely Sharp  :  1990      To Whom It May Concern:    For medical reasons relating to her pregnancy, I have instructed my patient, Karely to refrain from lifting anything greater than 25 pounds without help for the remainder of the pregnancy.  If you have questions concerning this patient's immediate care, please feel free to contact our office at [phone].    Sincerely,          DOV Juarez CNP

## 2024-12-03 ENCOUNTER — LAB (OUTPATIENT)
Dept: LAB | Facility: LAB | Age: 34
End: 2024-12-03
Payer: MEDICAID

## 2024-12-03 DIAGNOSIS — Z3A.24 24 WEEKS GESTATION OF PREGNANCY (HHS-HCC): ICD-10-CM

## 2024-12-03 LAB
ALBUMIN SERPL BCP-MCNC: 3.3 G/DL (ref 3.4–5)
ALP SERPL-CCNC: 101 U/L (ref 33–110)
ALT SERPL W P-5'-P-CCNC: 26 U/L (ref 7–45)
ANION GAP SERPL CALC-SCNC: 11 MMOL/L (ref 10–20)
AST SERPL W P-5'-P-CCNC: 24 U/L (ref 9–39)
BILIRUB SERPL-MCNC: 0.3 MG/DL (ref 0–1.2)
BUN SERPL-MCNC: 7 MG/DL (ref 6–23)
CALCIUM SERPL-MCNC: 8.8 MG/DL (ref 8.6–10.3)
CHLORIDE SERPL-SCNC: 105 MMOL/L (ref 98–107)
CO2 SERPL-SCNC: 25 MMOL/L (ref 21–32)
CREAT SERPL-MCNC: 0.5 MG/DL (ref 0.5–1.05)
CREAT UR-MCNC: 135.8 MG/DL (ref 20–320)
EGFRCR SERPLBLD CKD-EPI 2021: >90 ML/MIN/1.73M*2
ERYTHROCYTE [DISTWIDTH] IN BLOOD BY AUTOMATED COUNT: 13.2 % (ref 11.5–14.5)
GLUCOSE 1H P 50 G GLC PO SERPL-MCNC: 95 MG/DL
GLUCOSE SERPL-MCNC: 95 MG/DL (ref 74–99)
HCT VFR BLD AUTO: 35.5 % (ref 36–46)
HGB BLD-MCNC: 11.5 G/DL (ref 12–16)
MCH RBC QN AUTO: 26.4 PG (ref 26–34)
MCHC RBC AUTO-ENTMCNC: 32.4 G/DL (ref 32–36)
MCV RBC AUTO: 82 FL (ref 80–100)
NRBC BLD-RTO: 0 /100 WBCS (ref 0–0)
PLATELET # BLD AUTO: 294 X10*3/UL (ref 150–450)
POTASSIUM SERPL-SCNC: 3.8 MMOL/L (ref 3.5–5.3)
PROT SERPL-MCNC: 6 G/DL (ref 6.4–8.2)
PROT UR-ACNC: 15 MG/DL (ref 5–24)
PROT/CREAT UR: 0.11 MG/MG CREAT (ref 0–0.17)
RBC # BLD AUTO: 4.35 X10*6/UL (ref 4–5.2)
REFLEX ADDED, ANEMIA PANEL: NORMAL
SODIUM SERPL-SCNC: 137 MMOL/L (ref 136–145)
TREPONEMA PALLIDUM IGG+IGM AB [PRESENCE] IN SERUM OR PLASMA BY IMMUNOASSAY: NONREACTIVE
WBC # BLD AUTO: 14.5 X10*3/UL (ref 4.4–11.3)

## 2024-12-03 PROCEDURE — 82947 ASSAY GLUCOSE BLOOD QUANT: CPT

## 2024-12-03 PROCEDURE — 85027 COMPLETE CBC AUTOMATED: CPT

## 2024-12-03 PROCEDURE — 80053 COMPREHEN METABOLIC PANEL: CPT

## 2024-12-03 PROCEDURE — 82570 ASSAY OF URINE CREATININE: CPT

## 2024-12-03 PROCEDURE — 84156 ASSAY OF PROTEIN URINE: CPT

## 2024-12-03 PROCEDURE — 86780 TREPONEMA PALLIDUM: CPT

## 2024-12-03 PROCEDURE — 36415 COLL VENOUS BLD VENIPUNCTURE: CPT

## 2024-12-05 ENCOUNTER — APPOINTMENT (OUTPATIENT)
Dept: OBSTETRICS AND GYNECOLOGY | Facility: CLINIC | Age: 34
End: 2024-12-05
Payer: MEDICAID

## 2024-12-05 VITALS — WEIGHT: 250 LBS | BODY MASS INDEX: 44.29 KG/M2 | SYSTOLIC BLOOD PRESSURE: 133 MMHG | DIASTOLIC BLOOD PRESSURE: 85 MMHG

## 2024-12-05 DIAGNOSIS — Z3A.27 27 WEEKS GESTATION OF PREGNANCY (HHS-HCC): Primary | ICD-10-CM

## 2024-12-05 DIAGNOSIS — Z3A.16 16 WEEKS GESTATION OF PREGNANCY (HHS-HCC): ICD-10-CM

## 2024-12-05 DIAGNOSIS — E66.01 SEVERE OBESITY (BMI >= 40) (MULTI): ICD-10-CM

## 2024-12-05 DIAGNOSIS — Z3A.24 24 WEEKS GESTATION OF PREGNANCY (HHS-HCC): ICD-10-CM

## 2024-12-05 PROCEDURE — 99213 OFFICE O/P EST LOW 20 MIN: CPT | Performed by: NURSE PRACTITIONER

## 2024-12-05 NOTE — PROGRESS NOTES
Ob Visit  24     SUBJECTIVE      HPI: Karely Sharp is a 34 y.o.  at 27w3d here for RPNV.  She has 0 contractions, no bleeding, or LOF. Reports normal fetal movement.      OBJECTIVE  Visit Vitals  /85   Wt 113 kg (250 lb)   LMP 2024 (Approximate)   BMI 44.29 kg/m²   OB Status Pregnant   Smoking Status Never   BSA 2.24 m²            ASSESSMENT & PLAN    Karely Sharp is a 34 y.o.  at 27w3d here for the following concerns we addressed today:    Problem List Items Addressed This Visit          Ob-Gyn Problems    27 weeks gestation of pregnancy ([facility]) - Primary    Overview     Desired provider in labor: [] CNM  [] Physician  [x] Blood Products: [x] Yes, accepts [] No, needs counseling  [x] Initial BMI: 41.28   [x] Prenatal Labs: Equivocal rubella  [x] Cervical Cancer Screening up to date: WNL  [x] Rh status: Rh+  [x] Genetic Screening:   [x] NT US: (11-13 wks) late screen  [x] Baby ASA (if indicated): yes  [x] Pregnancy dated by: us er    [x] Anatomy US: (19-20 wks)    [] Federal Sterilization consent signed (if indicated):  [x] 1hr GCT at 24-28wks:   [] Rhogam (if indicated): n/a  [] Fetal Surveillance (if indicated):  [] Tdap (27-32 wks, may be given up to 36 wks if initial window missed):   [] RSV (32-36 wks) (Sept. to end of ):   [] Flu Vaccine:    [] Breastfeeding:  [] Postpartum Birth control method:   [] GBS at 36 - 37 wks:  [] 39 weeks discussion of IOL vs. Expectant management:  [] Mode of delivery ( anticipated ):     Patient and her partner here today at 24 weeks gestational age.  Patient's partner reporting he has concerns with patient.  These include dizzy spells that she is reporting, lack of eating, and her job not following work restrictions which had been documented in the letter that she has provided to her work place.  Discussed due to patient's BMI that excessive weight gain is not encouraged.  And she has indeed gained weight through the pregnancy thus  far.  Encouraged increase p.o. hydration primarily water.  Patient reports feeling hot at various times and lightheaded and dizzy at that time.    Reiterated guidelines for work restrictions for patient to take back to her workplace.         Relevant Medications    prenatal vitamin calcium-iron-folic 27 mg iron- 1 mg tablet       Other    Severe obesity (BMI >= 40) (Multi)    Overview     Will consider 39 week induction    Discussed current BMI of 43 with 11 pound weight gain in 4 weeks.  Informed of BMI guideline of 50 or above not being able to deliver at [facility] and would need to deliver at [facility].  Patient states understanding.  Encouraged patient to not restrict eating but to keep eating when she is hungry and to stop eating when she is full.    Anticipated NSTs and additional ultrasounds for fetal growth    Patient reports she is not taking baby aspirin as directed.  Encouraged to do so as prescribed.    At 27-week gestational age patient's BMI at 44.  Father of the baby states patient eats 1 meal a day and he wants her to eat more.  Encouraged healthy eating in reasonable amounts.  Anticipate probability of BMI reaching 50 by time of delivery and need for delivery at [facility].  Next ultrasound December 23          Other Visit Diagnoses       16 weeks gestation of pregnancy ([facility])        Relevant Medications    prenatal vitamin calcium-iron-folic 27 mg iron- 1 mg tablet              RTC in 2 weeks      Lillian Montague, DOV-CNP

## 2024-12-19 ENCOUNTER — APPOINTMENT (OUTPATIENT)
Dept: OBSTETRICS AND GYNECOLOGY | Facility: CLINIC | Age: 34
End: 2024-12-19
Payer: MEDICAID

## 2024-12-19 VITALS — BODY MASS INDEX: 44.64 KG/M2 | SYSTOLIC BLOOD PRESSURE: 132 MMHG | WEIGHT: 252 LBS | DIASTOLIC BLOOD PRESSURE: 80 MMHG

## 2024-12-19 DIAGNOSIS — E66.01 SEVERE OBESITY (BMI >= 40) (MULTI): ICD-10-CM

## 2024-12-19 DIAGNOSIS — Z3A.29 29 WEEKS GESTATION OF PREGNANCY (HHS-HCC): Primary | ICD-10-CM

## 2024-12-19 PROCEDURE — 99214 OFFICE O/P EST MOD 30 MIN: CPT | Performed by: NURSE PRACTITIONER

## 2024-12-19 PROCEDURE — 90715 TDAP VACCINE 7 YRS/> IM: CPT | Performed by: NURSE PRACTITIONER

## 2024-12-19 PROCEDURE — 90471 IMMUNIZATION ADMIN: CPT | Performed by: NURSE PRACTITIONER

## 2024-12-19 ASSESSMENT — ENCOUNTER SYMPTOMS
OCCASIONAL FEELINGS OF UNSTEADINESS: 0
DEPRESSION: 0
LOSS OF SENSATION IN FEET: 0

## 2024-12-19 NOTE — PROGRESS NOTES
Ob Visit  24     SUBJECTIVE      HPI: Karely Sharp is a 34 y.o.  at 29w3d here for RPNV.  She has 0 contractions, 0 bleeding, or LOF. Reports normal fetal movement. Patient reports increase stress with her older son       OBJECTIVE  Visit Vitals  /80   Wt 114 kg (252 lb)   LMP 2024 (Approximate)   BMI 44.64 kg/m²   OB Status Pregnant   Smoking Status Never   BSA 2.25 m²            ASSESSMENT & PLAN    Karely Sharp is a 34 y.o.  at 29w3d here for the following concerns we addressed today:    Problem List Items Addressed This Visit          Ob-Gyn Problems    29 weeks gestation of pregnancy ([facility]) - Primary    Overview     Desired provider in labor: [] CNM  [] Physician  [x] Blood Products: [x] Yes, accepts [] No, needs counseling  [x] Initial BMI: 41.28   [x] Prenatal Labs: Equivocal rubella  [x] Cervical Cancer Screening up to date: WNL  [x] Rh status: Rh+  [x] Genetic Screening:   [x] NT US: (11-13 wks) late screen  [x] Baby ASA (if indicated): yes  [x] Pregnancy dated by: us er    [x] Anatomy US: (19-20 wks)    [] Federal Sterilization consent signed (if indicated):  [x] 1hr GCT at 24-28wks:   [] Rhogam (if indicated): n/a  [] Fetal Surveillance (if indicated):  [x] Tdap (27-32 wks, may be given up to 36 wks if initial window missed):   [] RSV (32-36 wks) (Sept. to end of ):   [] Flu Vaccine:    [] Breastfeeding:  [] Postpartum Birth control method:   [] GBS at 36 - 37 wks:  [] 39 weeks discussion of IOL vs. Expectant management:  [] Mode of delivery ( anticipated ):     Patient and her partner here today at 24 weeks gestational age.  Patient's partner reporting he has concerns with patient.  These include dizzy spells that she is reporting, lack of eating, and her job not following work restrictions which had been documented in the letter that she has provided to her work place.  Discussed due to patient's BMI that excessive weight gain is not encouraged.  And she  has indeed gained weight through the pregnancy thus far.  Encouraged increase p.o. hydration primarily water.  Patient reports feeling hot at various times and lightheaded and dizzy at that time.    Reiterated guidelines for work restrictions for patient to take back to her workplace.    Patient here today with father of the baby.  He reports she is not taking her baby aspirin.  Importance again stressed.            Other    Severe obesity (BMI >= 40) (Multi)    Overview     Will consider 39 week induction    Discussed current BMI of 43 with 11 pound weight gain in 4 weeks.  Informed of BMI guideline of 50 or above not being able to deliver at [facility] and would need to deliver at [facility].  Patient states understanding.  Encouraged patient to not restrict eating but to keep eating when she is hungry and to stop eating when she is full.    Anticipated NSTs and additional ultrasounds for fetal growth    Patient reports she is not taking baby aspirin as directed.  Encouraged to do so as prescribed.    At 27-week gestational age patient's BMI at 44.  Father of the baby states patient eats 1 meal a day and he wants her to eat more.  Encouraged healthy eating in reasonable amounts.  Anticipate probability of BMI reaching 50 by time of delivery and need for delivery at [facility].  Next ultrasound December 23              RTC in 2 weeks      Lillian Montague, APRN-CNP

## 2024-12-23 ENCOUNTER — HOSPITAL ENCOUNTER (OUTPATIENT)
Dept: RADIOLOGY | Facility: CLINIC | Age: 34
Discharge: HOME | End: 2024-12-23
Payer: MEDICAID

## 2024-12-23 DIAGNOSIS — Z3A.16 16 WEEKS GESTATION OF PREGNANCY (HHS-HCC): ICD-10-CM

## 2024-12-23 PROCEDURE — 76816 OB US FOLLOW-UP PER FETUS: CPT | Performed by: STUDENT IN AN ORGANIZED HEALTH CARE EDUCATION/TRAINING PROGRAM

## 2024-12-23 PROCEDURE — 76816 OB US FOLLOW-UP PER FETUS: CPT

## 2024-12-23 PROCEDURE — 76819 FETAL BIOPHYS PROFIL W/O NST: CPT | Performed by: STUDENT IN AN ORGANIZED HEALTH CARE EDUCATION/TRAINING PROGRAM

## 2024-12-23 PROCEDURE — 76819 FETAL BIOPHYS PROFIL W/O NST: CPT

## 2025-01-02 ENCOUNTER — APPOINTMENT (OUTPATIENT)
Dept: OBSTETRICS AND GYNECOLOGY | Facility: CLINIC | Age: 35
End: 2025-01-02
Payer: MEDICAID

## 2025-01-02 VITALS — DIASTOLIC BLOOD PRESSURE: 90 MMHG | SYSTOLIC BLOOD PRESSURE: 130 MMHG | BODY MASS INDEX: 45.42 KG/M2 | WEIGHT: 256.4 LBS

## 2025-01-02 DIAGNOSIS — O16.3 ELEVATED BLOOD PRESSURE AFFECTING PREGNANCY IN THIRD TRIMESTER, ANTEPARTUM (HHS-HCC): ICD-10-CM

## 2025-01-02 DIAGNOSIS — Z3A.31 31 WEEKS GESTATION OF PREGNANCY (HHS-HCC): Primary | ICD-10-CM

## 2025-01-02 PROCEDURE — 99214 OFFICE O/P EST MOD 30 MIN: CPT | Performed by: NURSE PRACTITIONER

## 2025-01-02 NOTE — PROGRESS NOTES
Ob Visit  25     SUBJECTIVE      HPI: Karely Sharp is a 34 y.o.  at 31w3d here for RPNV.  She has 0 contractions, 0 bleeding, or LOF. Reports normal fetal movement.   OBJECTIVE  Visit Vitals  /90   Wt 116 kg (256 lb 6.4 oz)   LMP 2024 (Approximate)   BMI 45.42 kg/m²   OB Status Pregnant   Smoking Status Never   BSA 2.27 m²            ASSESSMENT & PLAN    Karely Sharp is a 34 y.o.  at 31w3d here for the following concerns we addressed today:    Problem List Items Addressed This Visit          Ob-Gyn Problems    31 weeks gestation of pregnancy ([facility]) - Primary    Overview     Desired provider in labor: [] CNM  [] Physician  [x] Blood Products: [x] Yes, accepts [] No, needs counseling  [x] Initial BMI: 41.28   [x] Prenatal Labs: Equivocal rubella  [x] Cervical Cancer Screening up to date: WNL  [x] Rh status: Rh+  [x] Genetic Screening:   [x] NT US: (11-13 wks) late screen  [x] Baby ASA (if indicated): yes  [x] Pregnancy dated by: us er    [x] Anatomy US: (19-20 wks)    [] Federal Sterilization consent signed (if indicated):  [x] 1hr GCT at 24-28wks:   [] Rhogam (if indicated): n/a  [] Fetal Surveillance (if indicated):  [x] Tdap (27-32 wks, may be given up to 36 wks if initial window missed):   [] RSV (32-36 wks) (Sept. to end of ):   [] Flu Vaccine:    [] Breastfeeding:  [] Postpartum Birth control method:   [] GBS at 36 - 37 wks:  [] 39 weeks discussion of IOL vs. Expectant management:  [] Mode of delivery ( anticipated ):     Patient and her partner here today at 24 weeks gestational age.  Patient's partner reporting he has concerns with patient.  These include dizzy spells that she is reporting, lack of eating, and her job not following work restrictions which had been documented in the letter that she has provided to her work place.  Discussed due to patient's BMI that excessive weight gain is not encouraged.  And she has indeed gained weight through the pregnancy  thus far.  Encouraged increase p.o. hydration primarily water.  Patient reports feeling hot at various times and lightheaded and dizzy at that time.    Reiterated guidelines for work restrictions for patient to take back to her workplace.    Patient here today with father of the baby.  He reports she is not taking her baby aspirin.  Importance again stressed.            Other    Elevated blood pressure affecting pregnancy in third trimester, antepartum ([facility])    Overview     Patient with mid range blood pressures.  Reports she had worked all day when blood pressure is elevated.  No edema noted denies headaches or visual changes.  Prior labs normal.    Will ask RN to have blood pressure cuff ordered for patient.  She will begin seeing MD partners in pregnancy.              RTC in 2 weeks      Lillian Montague, DOV-CNP

## 2025-01-17 ENCOUNTER — LAB (OUTPATIENT)
Dept: LAB | Facility: LAB | Age: 35
End: 2025-01-17
Payer: MEDICAID

## 2025-01-17 ENCOUNTER — APPOINTMENT (OUTPATIENT)
Dept: OBSTETRICS AND GYNECOLOGY | Facility: CLINIC | Age: 35
End: 2025-01-17
Payer: MEDICAID

## 2025-01-17 VITALS — WEIGHT: 258 LBS | SYSTOLIC BLOOD PRESSURE: 156 MMHG | BODY MASS INDEX: 45.7 KG/M2 | DIASTOLIC BLOOD PRESSURE: 98 MMHG

## 2025-01-17 DIAGNOSIS — O10.919 CHRONIC HYPERTENSION AFFECTING PREGNANCY (HHS-HCC): ICD-10-CM

## 2025-01-17 DIAGNOSIS — Z3A.33 33 WEEKS GESTATION OF PREGNANCY (HHS-HCC): Primary | ICD-10-CM

## 2025-01-17 LAB
ALBUMIN SERPL BCP-MCNC: 3.3 G/DL (ref 3.4–5)
ALP SERPL-CCNC: 175 U/L (ref 33–110)
ALT SERPL W P-5'-P-CCNC: 38 U/L (ref 7–45)
ANION GAP SERPL CALC-SCNC: 14 MMOL/L (ref 10–20)
AST SERPL W P-5'-P-CCNC: 31 U/L (ref 9–39)
BILIRUB SERPL-MCNC: 0.4 MG/DL (ref 0–1.2)
BUN SERPL-MCNC: 10 MG/DL (ref 6–23)
CALCIUM SERPL-MCNC: 9.5 MG/DL (ref 8.6–10.3)
CHLORIDE SERPL-SCNC: 106 MMOL/L (ref 98–107)
CO2 SERPL-SCNC: 22 MMOL/L (ref 21–32)
CREAT SERPL-MCNC: 0.61 MG/DL (ref 0.5–1.05)
EGFRCR SERPLBLD CKD-EPI 2021: >90 ML/MIN/1.73M*2
ERYTHROCYTE [DISTWIDTH] IN BLOOD BY AUTOMATED COUNT: 14.4 % (ref 11.5–14.5)
GLUCOSE SERPL-MCNC: 68 MG/DL (ref 74–99)
HCT VFR BLD AUTO: 39.6 % (ref 36–46)
HGB BLD-MCNC: 12.6 G/DL (ref 12–16)
MCH RBC QN AUTO: 25.2 PG (ref 26–34)
MCHC RBC AUTO-ENTMCNC: 31.8 G/DL (ref 32–36)
MCV RBC AUTO: 79 FL (ref 80–100)
NRBC BLD-RTO: 0 /100 WBCS (ref 0–0)
PLATELET # BLD AUTO: 265 X10*3/UL (ref 150–450)
POTASSIUM SERPL-SCNC: 4.4 MMOL/L (ref 3.5–5.3)
PROT SERPL-MCNC: 6.6 G/DL (ref 6.4–8.2)
RBC # BLD AUTO: 5 X10*6/UL (ref 4–5.2)
SODIUM SERPL-SCNC: 138 MMOL/L (ref 136–145)
WBC # BLD AUTO: 14.4 X10*3/UL (ref 4.4–11.3)

## 2025-01-17 PROCEDURE — 80053 COMPREHEN METABOLIC PANEL: CPT

## 2025-01-17 PROCEDURE — 85027 COMPLETE CBC AUTOMATED: CPT

## 2025-01-17 RX ORDER — NIFEDIPINE 30 MG/1
30 TABLET, FILM COATED, EXTENDED RELEASE ORAL
Qty: 30 TABLET | Refills: 11 | Status: SHIPPED | OUTPATIENT
Start: 2025-01-17 | End: 2026-01-17

## 2025-01-17 NOTE — LETTER
January 17, 2025     Patient: Karely Sharp   YOB: 1990   Date of Visit: 1/17/2025       To Whom It May Concern:    It is my medical opinion that Karely Sharp  Should be off work starting 2/8 until at least 6 weeks after the delivery of her baby .    If you have any questions or concerns, please don't hesitate to call.         Sincerely,        Rex Hope MD    CC: No Recipients

## 2025-01-17 NOTE — PROGRESS NOTES
Karely Sharp is a 34 y.o.  at 33w4d GA here for OB visit.      Subjective     No acute complaints.  Denies vaginal bleeding, leakage of fluid, painful regular uterine contractions, decreased fetal movement.     OB History    Para Term  AB Living   5 1 1 0 3 1   SAB IAB Ectopic Multiple Live Births   0 3 0 0 1      # Outcome Date GA Lbr Bret/2nd Weight Sex Type Anes PTL Lv   5 Current            4 Term 13    M Vag-Spont   SHANAE   3 IAB            2 IAB            1 IAB               Obstetric Comments   Patient reports having to do some type of 24-hour urine test with her pregnancy in .  Does not remember having a diagnosis of preeclampsia.          Objective   Physical Exam  Weight: 117 kg (258 lb)  Expected Total Weight Gain: 5 kg (11 lb)-9 kg (19 lb)   Pregravid BMI: 41.28  BP: (!) 156/98  --     --                --  OBGyn Exam     ASSESSMENT & PLAN    Karely Sharp is a 34 y.o.  at 33w4d here for the following concerns we addressed today:    Problem List Items Addressed This Visit       Chronic hypertension affecting pregnancy ([facility])    Overview     : Blood pressure high mild range in the setting of chronic hypertension.  Will start patient on oral nifedipine XL 30 mg daily.  Patient to obtain ambulatory CBC/CMP/UPC today          Relevant Medications    NIFEdipine ER (Adalat CC) 30 mg 24 hr tablet    Other Relevant Orders    Fetal nonstress test (Completed)    CBC    Comprehensive Metabolic Panel    Protein, Urine Random    33 weeks gestation of pregnancy ([facility]) - Primary    Overview     Desired provider in labor: [] CNM  [] Physician  [x] Blood Products: [x] Yes, accepts [] No, needs counseling  [x] Initial BMI: 41.28   [x] Prenatal Labs: Equivocal rubella  [x] Cervical Cancer Screening up to date: WNL  [x] Rh status: Rh+  [x] Genetic Screening: low risk XY  [x] NT US: (11-13 wks) late screen  [x] Baby ASA (if indicated): yes  [x] Pregnancy dated by: us  er    [x] Anatomy US: (19-20 wks)    [] Federal Sterilization consent signed (if indicated):  [x] 1hr GCT at 24-28wks:   [x] Rhogam (if indicated): n/a  [] Fetal Surveillance (if indicated):   [x] Tdap (27-32 wks, may be given up to 36 wks if initial window missed):   [] RSV (32-36 wks) (Sept. to end of Jan):   [] Flu Vaccine:    [] Breastfeeding:  [] Postpartum Birth control method:   [] GBS at 36 - 37 wks:  [] 39 weeks discussion of IOL vs. Expectant management:  [] Mode of delivery ( anticipated ):               Medic from a preeclampsia perspective will obtain ambulatory labs and have patient follow-up next week.    Follow up in 1 week for blood pressure check and NST.      Rex Hoep MD

## 2025-01-17 NOTE — PROCEDURES
Karely Sharp, a  at 33w4d with an PRADEEP of 3/3/2025, by Ultrasound, was seen at [facility] for a nonstress test.    Non-Stress Test   Baseline Fetal Heart Rate for Non-Stress Test: 140 BPM  Variability in Waveform for Non-Stress Test: Moderate  Accelerations in Non-Stress Test: Yes  Decelerations in Non-Stress Test: None  Contractions in Non-Stress Test: Not present  Interpretation of Non-Stress Test   Interpretation of Non-Stress Test: Reactive

## 2025-01-22 ENCOUNTER — HOSPITAL ENCOUNTER (OUTPATIENT)
Dept: RADIOLOGY | Facility: CLINIC | Age: 35
Discharge: HOME | End: 2025-01-22
Payer: MEDICAID

## 2025-01-22 ENCOUNTER — APPOINTMENT (OUTPATIENT)
Dept: OBSTETRICS AND GYNECOLOGY | Facility: CLINIC | Age: 35
End: 2025-01-22
Payer: MEDICAID

## 2025-01-22 VITALS — SYSTOLIC BLOOD PRESSURE: 133 MMHG | WEIGHT: 256 LBS | BODY MASS INDEX: 45.35 KG/M2 | DIASTOLIC BLOOD PRESSURE: 87 MMHG

## 2025-01-22 DIAGNOSIS — Z3A.34 34 WEEKS GESTATION OF PREGNANCY (HHS-HCC): ICD-10-CM

## 2025-01-22 DIAGNOSIS — Z3A.16 16 WEEKS GESTATION OF PREGNANCY (HHS-HCC): ICD-10-CM

## 2025-01-22 DIAGNOSIS — Z23 NEED FOR HPV VACCINE: ICD-10-CM

## 2025-01-22 DIAGNOSIS — O47.00: ICD-10-CM

## 2025-01-22 DIAGNOSIS — Z29.11 NEED FOR RSV IMMUNIZATION: ICD-10-CM

## 2025-01-22 DIAGNOSIS — O10.919 CHRONIC HYPERTENSION AFFECTING PREGNANCY (HHS-HCC): Primary | ICD-10-CM

## 2025-01-22 PROCEDURE — 76819 FETAL BIOPHYS PROFIL W/O NST: CPT | Performed by: STUDENT IN AN ORGANIZED HEALTH CARE EDUCATION/TRAINING PROGRAM

## 2025-01-22 PROCEDURE — 76819 FETAL BIOPHYS PROFIL W/O NST: CPT

## 2025-01-22 PROCEDURE — 76816 OB US FOLLOW-UP PER FETUS: CPT

## 2025-01-22 PROCEDURE — 76816 OB US FOLLOW-UP PER FETUS: CPT | Performed by: STUDENT IN AN ORGANIZED HEALTH CARE EDUCATION/TRAINING PROGRAM

## 2025-01-22 NOTE — PROGRESS NOTES
Karely Sharp is a 34 y.o.  at 34w2d GA here for OB visit.      Subjective     No acute complaints.  Denies vaginal bleeding, leakage of fluid, painful regular uterine contractions, decreased fetal movement.     OB History    Para Term  AB Living   5 1 1 0 3 1   SAB IAB Ectopic Multiple Live Births   0 3 0 0 1      # Outcome Date GA Lbr Bret/2nd Weight Sex Type Anes PTL Lv   5 Current            4 Term 13    M Vag-Spont   SHANAE   3 IAB            2 IAB            1 IAB               Obstetric Comments   Patient reports having to do some type of 24-hour urine test with her pregnancy in .  Does not remember having a diagnosis of preeclampsia.          Objective   Physical Exam  Weight: 116 kg (256 lb)  Expected Total Weight Gain: 5 kg (11 lb)-9 kg (19 lb)   Pregravid BMI: 41.28  BP: 133/87  --     --  Fetal Heart Rate: 145      Dilation: Closed      --  Physical Exam  Constitutional:       General: She is not in acute distress.     Appearance: She is not ill-appearing, toxic-appearing or diaphoretic.   Pulmonary:      Effort: Pulmonary effort is normal.   Neurological:      Mental Status: She is alert.   Psychiatric:         Mood and Affect: Mood normal.   Vitals reviewed. Exam conducted with a chaperone present.          ASSESSMENT & PLAN    Karely Sharp is a 34 y.o.  at 34w2d here for the following concerns we addressed today:    Problem List Items Addressed This Visit       Need for RSV immunization    Need for HPV vaccine    Overview     Needs HPV vaccine #3 PP         Chronic hypertension affecting pregnancy ([facility]) - Primary    Overview     : Blood pressure high mild range in the setting of chronic hypertension.  Will start patient on oral nifedipine XL 30 mg daily.  Patient to obtain ambulatory CBC/CMP/UPC today   : labs reviewed and WNL; BP today WNL on nifed 30mg daily         Relevant Orders    Fetal nonstress test (Completed)    34 weeks gestation of  pregnancy ([facility])    Overview     Desired provider in labor: [] CNM  [] Physician  [x] Blood Products: [x] Yes, accepts [] No, needs counseling  [x] Initial BMI: 41.28   [x] Prenatal Labs: Equivocal rubella  [x] Cervical Cancer Screening up to date: WNL  [x] Rh status: Rh+  [x] Genetic Screening: low risk XY  [x] NT US: (11-13 wks) late screen  [x] Baby ASA (if indicated): yes  [x] Pregnancy dated by: us er    [x] Anatomy US: (19-20 wks)    [] Federal Sterilization consent signed (if indicated):  [x] 1hr GCT at 24-28wks:   [x] Rhogam (if indicated): n/a  [x] Fetal Surveillance (if indicated): Weekly NSTs given chronic hypertension  [x] Tdap (27-32 wks, may be given up to 36 wks if initial window missed):   [] RSV (32-36 wks) (Sept. to end of Jan):   [] Flu Vaccine:    [] Breastfeeding:  [] Postpartum Birth control method:   [] GBS at 36 - 37 wks:  [] 39 weeks discussion of IOL vs. Expectant management:  [] Mode of delivery ( anticipated ):             Other Visit Diagnoses       Premature uterine contractions ([facility])                  NST reactive today.  Patient is noted to be every 1 to 2 minutes so digital exam was performed cervix is long and closed.    Follow up in 1 weeks      Rex Hope MD

## 2025-01-22 NOTE — LETTER
January 22, 2025     Patient: Karely Sharp   YOB: 1990   Date of Visit: 1/22/2025       To Whom It May Concern:    It is my medical opinion that Karely Sharp  Should be off work starting 1/24 until at least 6 weeks after the delivery of her baby .     If you have any questions or concerns, please don't hesitate to call.         Sincerely,        Rex Hope MD    CC: No Recipients

## 2025-01-22 NOTE — PROCEDURES
Karely Sharp, a  at 34w2d with an PRADEEP of 3/3/2025, by Ultrasound, was seen at [facility] for a nonstress test.    Non-Stress Test   Baseline Fetal Heart Rate for Non-Stress Test: 145 BPM  Variability in Waveform for Non-Stress Test: Moderate  Accelerations in Non-Stress Test: Yes  Decelerations in Non-Stress Test: None  Interpretation of Non-Stress Test   Interpretation of Non-Stress Test: Reactive

## 2025-01-29 ENCOUNTER — APPOINTMENT (OUTPATIENT)
Dept: OBSTETRICS AND GYNECOLOGY | Facility: CLINIC | Age: 35
End: 2025-01-29
Payer: MEDICAID

## 2025-01-29 VITALS — WEIGHT: 255 LBS | SYSTOLIC BLOOD PRESSURE: 153 MMHG | BODY MASS INDEX: 45.17 KG/M2 | DIASTOLIC BLOOD PRESSURE: 91 MMHG

## 2025-01-29 DIAGNOSIS — Z3A.35 35 WEEKS GESTATION OF PREGNANCY (HHS-HCC): Primary | ICD-10-CM

## 2025-01-29 DIAGNOSIS — O10.919 CHRONIC HYPERTENSION AFFECTING PREGNANCY (HHS-HCC): ICD-10-CM

## 2025-01-29 NOTE — PROCEDURES
Karelyfarzaneh Sharp, a  at 35w2d with an PRADEEP of 3/3/2025, by Ultrasound, was seen at [facility] for a nonstress test.    Non-Stress Test   Baseline Fetal Heart Rate for Non-Stress Test: 135 BPM  Variability in Waveform for Non-Stress Test: Moderate  Accelerations in Non-Stress Test: Yes  Decelerations in Non-Stress Test: None  Contractions in Non-Stress Test: Irregular

## 2025-01-29 NOTE — PROGRESS NOTES
Karely Sharp is a 34 y.o.  at 35w2d GA here for OB visit.      Subjective     No acute complaints.  Denies vaginal bleeding, leakage of fluid, painful regular uterine contractions, decreased fetal movement.     OB History    Para Term  AB Living   5 1 1 0 3 1   SAB IAB Ectopic Multiple Live Births   0 3 0 0 1      # Outcome Date GA Lbr Bret/2nd Weight Sex Type Anes PTL Lv   5 Current            4 Term 13    M Vag-Spont   SHANAE   3 IAB            2 IAB            1 IAB               Obstetric Comments   Patient reports having to do some type of 24-hour urine test with her pregnancy in .  Does not remember having a diagnosis of preeclampsia.          Objective   Physical Exam  Weight: 116 kg (255 lb)  Expected Total Weight Gain: 5 kg (11 lb)-9 kg (19 lb)   Pregravid BMI: 41.28  BP: (!) 153/91  --     --                --  Physical Exam  Constitutional:       General: She is not in acute distress.     Appearance: She is not ill-appearing, toxic-appearing or diaphoretic.   Pulmonary:      Effort: Pulmonary effort is normal.   Neurological:      Mental Status: She is alert.   Psychiatric:         Mood and Affect: Mood normal.   Vitals reviewed. Exam conducted with a chaperone present.          ASSESSMENT & PLAN    Karely Sharp is a 34 y.o.  at 35w2d here for the following concerns we addressed today:    Problem List Items Addressed This Visit       35 weeks gestation of pregnancy ([facility]) - Primary    Overview     Desired provider in labor: [] CNM  [] Physician  [x] Blood Products: [x] Yes, accepts [] No, needs counseling  [x] Initial BMI: 41.28   [x] Prenatal Labs: Equivocal rubella  [x] Cervical Cancer Screening up to date: WNL  [x] Rh status: Rh+  [x] Genetic Screening: low risk XY  [x] NT US: (11-13 wks) late screen  [x] Baby ASA (if indicated): yes  [x] Pregnancy dated by: us er    [x] Anatomy US: (19-20 wks)    [] Federal Sterilization consent signed (if  indicated):  [x] 1hr GCT at 24-28wks:   [x] Rhogam (if indicated): n/a  [x] Fetal Surveillance (if indicated): Weekly NSTs given chronic hypertension  [x] Tdap (27-32 wks, may be given up to 36 wks if initial window missed):   [] RSV (32-36 wks) (Sept. to end of Jan):   [] Flu Vaccine:    [] Breastfeeding:  [] Postpartum Birth control method:   [] GBS at 36 - 37 wks: pending  [x] 39 weeks discussion of IOL vs. Expectant management: N/A  [x] Mode of delivery ( anticipated ): IOL at 37 weeks           Relevant Orders    Group B Streptococcus (GBS) Prenatal Screen, Culture    Fetal nonstress test (Completed)    Chronic hypertension affecting pregnancy ([facility])    Overview     1/17: Blood pressure high mild range in the setting of chronic hypertension.  Will start patient on oral nifedipine XL 30 mg daily.  Patient to obtain ambulatory CBC/CMP/UPC today   1/22: labs reviewed and WNL; BP today WNL on nifed 30mg daily  1/29: /91, current medications today.  Will increase nifedipine to 30 mg twice daily. IOL scheduled for 37 weeks.         Relevant Orders    Labor Induction         NST reactive today.  Increased nifedipine from 30 mg daily to twice daily.  Labor induction scheduled for 37 weeks.    Follow up in 1 weeks      Rex Hope MD

## 2025-02-01 LAB — GP B STREP SPEC QL CULT: ABNORMAL

## 2025-02-05 ENCOUNTER — APPOINTMENT (OUTPATIENT)
Dept: OBSTETRICS AND GYNECOLOGY | Facility: CLINIC | Age: 35
End: 2025-02-05
Payer: MEDICAID

## 2025-02-05 VITALS — SYSTOLIC BLOOD PRESSURE: 144 MMHG | DIASTOLIC BLOOD PRESSURE: 87 MMHG | WEIGHT: 260.2 LBS | BODY MASS INDEX: 46.09 KG/M2

## 2025-02-05 DIAGNOSIS — Z3A.36 36 WEEKS GESTATION OF PREGNANCY (HHS-HCC): ICD-10-CM

## 2025-02-05 DIAGNOSIS — O10.919 CHRONIC HYPERTENSION AFFECTING PREGNANCY (HHS-HCC): ICD-10-CM

## 2025-02-05 NOTE — PROCEDURES
Karely Sharp, a  at 36w2d with an PRADEEP of 3/3/2025, by Ultrasound, was seen at [facility] for a nonstress test.    Non-Stress Test   Baseline Fetal Heart Rate for Non-Stress Test: 130 BPM  Variability in Waveform for Non-Stress Test: Moderate  Accelerations in Non-Stress Test: Yes  Decelerations in Non-Stress Test: None  Contractions in Non-Stress Test: Regular  NST Contraction Frequency: q1-2m but not felt

## 2025-02-05 NOTE — PROGRESS NOTES
Karely Sharp is a 34 y.o.  at 36w2d GA here for OB visit.      Subjective     No acute complaints.  Denies vaginal bleeding, leakage of fluid, painful regular uterine contractions, decreased fetal movement.     OB History    Para Term  AB Living   5 1 1 0 3 1   SAB IAB Ectopic Multiple Live Births   0 3 0 0 1      # Outcome Date GA Lbr Bret/2nd Weight Sex Type Anes PTL Lv   5 Current            4 Term 13    M Vag-Spont   SHANAE   3 IAB            2 IAB            1 IAB               Obstetric Comments   Patient reports having to do some type of 24-hour urine test with her pregnancy in .  Does not remember having a diagnosis of preeclampsia.          Objective   Physical Exam  Weight: 118 kg (260 lb 3.2 oz)  Expected Total Weight Gain: 5 kg (11 lb)-9 kg (19 lb)   Pregravid BMI: 41.28  BP: 144/87  --     --                --  Physical Exam  Constitutional:       General: She is not in acute distress.     Appearance: She is not ill-appearing, toxic-appearing or diaphoretic.   Pulmonary:      Effort: Pulmonary effort is normal.   Neurological:      Mental Status: She is alert.   Psychiatric:         Mood and Affect: Mood normal.   Vitals reviewed.          ASSESSMENT & PLAN    Karely Sharp is a 34 y.o.  at 36w2d here for the following concerns we addressed today:    Problem List Items Addressed This Visit       36 weeks gestation of pregnancy ([facility])    Overview     Desired provider in labor: [] CNM  [] Physician  [x] Blood Products: [x] Yes, accepts [] No, needs counseling  [x] Initial BMI: 41.28   [x] Prenatal Labs: Equivocal rubella  [x] Cervical Cancer Screening up to date: WNL  [x] Rh status: Rh+  [x] Genetic Screening: low risk XY  [x] NT US: (11-13 wks) late screen  [x] Baby ASA (if indicated): yes  [x] Pregnancy dated by: us er    [x] Anatomy US: (19-20 wks)    [] Federal Sterilization consent signed (if indicated):  [x] 1hr GCT at 24-28wks:   [x] Rhogam (if  indicated): n/a  [x] Fetal Surveillance (if indicated): Weekly NSTs given chronic hypertension  [x] Tdap (27-32 wks, may be given up to 36 wks if initial window missed):   [] RSV (32-36 wks) (Sept. to end of Jan):   [] Flu Vaccine:    [] Breastfeeding:  [] Postpartum Birth control method:   [x] GBS at 36 - 37 wks: POS  [x] 39 weeks discussion of IOL vs. Expectant management: N/A  [x] Mode of delivery ( anticipated ): IOL at 37 weeks           Chronic hypertension affecting pregnancy ([facility])    Overview     1/17: Blood pressure high mild range in the setting of chronic hypertension.  Will start patient on oral nifedipine XL 30 mg daily.  Patient to obtain ambulatory CBC/CMP/UPC today   1/22: labs reviewed and WNL; BP today WNL on nifed 30mg daily  1/29: /91, current medications today.  Will increase nifedipine to 30 mg twice daily. IOL scheduled for 37 weeks.         Relevant Orders    POCT UA Automated manually resulted    Fetal nonstress test (Completed)         Patient is scheduled for 37-week induction on Monday.  NST reactive today.  Patient continues to contract frequently but does not feel contractions.        Rex Hope MD

## 2025-02-08 ENCOUNTER — HOSPITAL ENCOUNTER (INPATIENT)
Facility: HOSPITAL | Age: 35
End: 2025-02-08
Attending: STUDENT IN AN ORGANIZED HEALTH CARE EDUCATION/TRAINING PROGRAM | Admitting: STUDENT IN AN ORGANIZED HEALTH CARE EDUCATION/TRAINING PROGRAM
Payer: MEDICAID

## 2025-02-08 DIAGNOSIS — O10.919 CHRONIC HYPERTENSION AFFECTING PREGNANCY (HHS-HCC): Primary | ICD-10-CM

## 2025-02-08 LAB
ABO GROUP (TYPE) IN BLOOD: NORMAL
ALBUMIN SERPL BCP-MCNC: 3.5 G/DL (ref 3.4–5)
ALP SERPL-CCNC: 220 U/L (ref 33–110)
ALT SERPL W P-5'-P-CCNC: 17 U/L (ref 7–45)
ANION GAP SERPL CALC-SCNC: 15 MMOL/L (ref 10–20)
ANTIBODY SCREEN: NORMAL
AST SERPL W P-5'-P-CCNC: 17 U/L (ref 9–39)
BILIRUB SERPL-MCNC: 0.3 MG/DL (ref 0–1.2)
BUN SERPL-MCNC: 11 MG/DL (ref 6–23)
CALCIUM SERPL-MCNC: 9.5 MG/DL (ref 8.6–10.6)
CHLORIDE SERPL-SCNC: 105 MMOL/L (ref 98–107)
CO2 SERPL-SCNC: 21 MMOL/L (ref 21–32)
CREAT SERPL-MCNC: 0.75 MG/DL (ref 0.5–1.05)
CREAT UR-MCNC: 103 MG/DL (ref 20–320)
EGFRCR SERPLBLD CKD-EPI 2021: >90 ML/MIN/1.73M*2
ERYTHROCYTE [DISTWIDTH] IN BLOOD BY AUTOMATED COUNT: 15.1 % (ref 11.5–14.5)
GLUCOSE SERPL-MCNC: 73 MG/DL (ref 74–99)
HCT VFR BLD AUTO: 38.7 % (ref 36–46)
HGB BLD-MCNC: 12.1 G/DL (ref 12–16)
MCH RBC QN AUTO: 25.2 PG (ref 26–34)
MCHC RBC AUTO-ENTMCNC: 31.3 G/DL (ref 32–36)
MCV RBC AUTO: 81 FL (ref 80–100)
NRBC BLD-RTO: 0 /100 WBCS (ref 0–0)
PLATELET # BLD AUTO: 263 X10*3/UL (ref 150–450)
POTASSIUM SERPL-SCNC: 4 MMOL/L (ref 3.5–5.3)
PROT SERPL-MCNC: 6.3 G/DL (ref 6.4–8.2)
PROT UR-ACNC: 16 MG/DL (ref 5–24)
PROT/CREAT UR: 0.16 MG/MG CREAT (ref 0–0.17)
RBC # BLD AUTO: 4.81 X10*6/UL (ref 4–5.2)
RH FACTOR (ANTIGEN D): NORMAL
SODIUM SERPL-SCNC: 137 MMOL/L (ref 136–145)
WBC # BLD AUTO: 16.2 X10*3/UL (ref 4.4–11.3)

## 2025-02-08 PROCEDURE — 4500999001 HC ED NO CHARGE

## 2025-02-08 PROCEDURE — 99222 1ST HOSP IP/OBS MODERATE 55: CPT | Performed by: STUDENT IN AN ORGANIZED HEALTH CARE EDUCATION/TRAINING PROGRAM

## 2025-02-08 PROCEDURE — 82570 ASSAY OF URINE CREATININE: CPT | Performed by: STUDENT IN AN ORGANIZED HEALTH CARE EDUCATION/TRAINING PROGRAM

## 2025-02-08 PROCEDURE — 86901 BLOOD TYPING SEROLOGIC RH(D): CPT | Performed by: STUDENT IN AN ORGANIZED HEALTH CARE EDUCATION/TRAINING PROGRAM

## 2025-02-08 PROCEDURE — 2500000004 HC RX 250 GENERAL PHARMACY W/ HCPCS (ALT 636 FOR OP/ED): Mod: SE

## 2025-02-08 PROCEDURE — 85027 COMPLETE CBC AUTOMATED: CPT | Performed by: STUDENT IN AN ORGANIZED HEALTH CARE EDUCATION/TRAINING PROGRAM

## 2025-02-08 PROCEDURE — 84075 ASSAY ALKALINE PHOSPHATASE: CPT | Performed by: STUDENT IN AN ORGANIZED HEALTH CARE EDUCATION/TRAINING PROGRAM

## 2025-02-08 PROCEDURE — 86780 TREPONEMA PALLIDUM: CPT | Performed by: STUDENT IN AN ORGANIZED HEALTH CARE EDUCATION/TRAINING PROGRAM

## 2025-02-08 PROCEDURE — 36415 COLL VENOUS BLD VENIPUNCTURE: CPT | Performed by: STUDENT IN AN ORGANIZED HEALTH CARE EDUCATION/TRAINING PROGRAM

## 2025-02-08 PROCEDURE — 1120000001 HC OB PRIVATE ROOM DAILY

## 2025-02-08 PROCEDURE — 7210000002 HC LABOR PER HOUR

## 2025-02-08 PROCEDURE — 99199 UNLISTED SPECIAL SVC PX/RPRT: CPT

## 2025-02-08 PROCEDURE — 2500000004 HC RX 250 GENERAL PHARMACY W/ HCPCS (ALT 636 FOR OP/ED): Mod: SE | Performed by: STUDENT IN AN ORGANIZED HEALTH CARE EDUCATION/TRAINING PROGRAM

## 2025-02-08 PROCEDURE — 2500000001 HC RX 250 WO HCPCS SELF ADMINISTERED DRUGS (ALT 637 FOR MEDICARE OP): Mod: SE | Performed by: STUDENT IN AN ORGANIZED HEALTH CARE EDUCATION/TRAINING PROGRAM

## 2025-02-08 PROCEDURE — 2500000002 HC RX 250 W HCPCS SELF ADMINISTERED DRUGS (ALT 637 FOR MEDICARE OP, ALT 636 FOR OP/ED): Mod: SE | Performed by: STUDENT IN AN ORGANIZED HEALTH CARE EDUCATION/TRAINING PROGRAM

## 2025-02-08 RX ORDER — OXYTOCIN 10 [USP'U]/ML
10 INJECTION, SOLUTION INTRAMUSCULAR; INTRAVENOUS ONCE AS NEEDED
Status: ACTIVE | OUTPATIENT
Start: 2025-02-08

## 2025-02-08 RX ORDER — NIFEDIPINE 30 MG/1
30 TABLET, FILM COATED, EXTENDED RELEASE ORAL 2 TIMES DAILY
Status: DISPENSED | OUTPATIENT
Start: 2025-02-08

## 2025-02-08 RX ORDER — LOPERAMIDE HYDROCHLORIDE 2 MG/1
4 CAPSULE ORAL EVERY 2 HOUR PRN
Status: ACTIVE | OUTPATIENT
Start: 2025-02-08

## 2025-02-08 RX ORDER — CALCIUM GLUCONATE 98 MG/ML
1 INJECTION, SOLUTION INTRAVENOUS ONCE AS NEEDED
Status: ACTIVE | OUTPATIENT
Start: 2025-02-08

## 2025-02-08 RX ORDER — TRANEXAMIC ACID 100 MG/ML
1000 INJECTION, SOLUTION INTRAVENOUS ONCE AS NEEDED
Status: ACTIVE | OUTPATIENT
Start: 2025-02-08 | End: 2025-02-11

## 2025-02-08 RX ORDER — ONDANSETRON HYDROCHLORIDE 2 MG/ML
4 INJECTION, SOLUTION INTRAVENOUS EVERY 6 HOURS PRN
Status: DISPENSED | OUTPATIENT
Start: 2025-02-08

## 2025-02-08 RX ORDER — METOCLOPRAMIDE 10 MG/1
10 TABLET ORAL ONCE
Status: COMPLETED | OUTPATIENT
Start: 2025-02-08 | End: 2025-02-08

## 2025-02-08 RX ORDER — OXYTOCIN/0.9 % SODIUM CHLORIDE 30/500 ML
60 PLASTIC BAG, INJECTION (ML) INTRAVENOUS ONCE AS NEEDED
Status: ACTIVE | OUTPATIENT
Start: 2025-02-08

## 2025-02-08 RX ORDER — METHYLERGONOVINE MALEATE 0.2 MG/ML
0.2 INJECTION INTRAVENOUS ONCE AS NEEDED
Status: ACTIVE | OUTPATIENT
Start: 2025-02-08

## 2025-02-08 RX ORDER — MISOPROSTOL 200 UG/1
800 TABLET ORAL ONCE AS NEEDED
Status: ACTIVE | OUTPATIENT
Start: 2025-02-08

## 2025-02-08 RX ORDER — HYDRALAZINE HYDROCHLORIDE 20 MG/ML
5 INJECTION INTRAMUSCULAR; INTRAVENOUS ONCE AS NEEDED
Status: ACTIVE | OUTPATIENT
Start: 2025-02-08

## 2025-02-08 RX ORDER — TERBUTALINE SULFATE 1 MG/ML
0.25 INJECTION SUBCUTANEOUS ONCE AS NEEDED
Status: ACTIVE | OUTPATIENT
Start: 2025-02-08

## 2025-02-08 RX ORDER — ACETAMINOPHEN 325 MG/1
975 TABLET ORAL ONCE
Status: COMPLETED | OUTPATIENT
Start: 2025-02-08 | End: 2025-02-08

## 2025-02-08 RX ORDER — CARBOPROST TROMETHAMINE 250 UG/ML
250 INJECTION, SOLUTION INTRAMUSCULAR ONCE AS NEEDED
Status: ACTIVE | OUTPATIENT
Start: 2025-02-08

## 2025-02-08 RX ORDER — LIDOCAINE HYDROCHLORIDE 10 MG/ML
30 INJECTION, SOLUTION INFILTRATION; PERINEURAL ONCE AS NEEDED
Status: ACTIVE | OUTPATIENT
Start: 2025-02-08

## 2025-02-08 RX ORDER — MAGNESIUM SULFATE HEPTAHYDRATE 40 MG/ML
2 INJECTION, SOLUTION INTRAVENOUS CONTINUOUS
Status: DISPENSED | OUTPATIENT
Start: 2025-02-08

## 2025-02-08 RX ORDER — DIPHENHYDRAMINE HCL 25 MG
25 CAPSULE ORAL ONCE
Status: COMPLETED | OUTPATIENT
Start: 2025-02-08 | End: 2025-02-08

## 2025-02-08 RX ORDER — FENTANYL CITRATE 50 UG/ML
50 INJECTION, SOLUTION INTRAMUSCULAR; INTRAVENOUS ONCE
Status: COMPLETED | OUTPATIENT
Start: 2025-02-08 | End: 2025-02-08

## 2025-02-08 RX ORDER — LABETALOL HYDROCHLORIDE 5 MG/ML
20 INJECTION, SOLUTION INTRAVENOUS ONCE AS NEEDED
Status: ACTIVE | OUTPATIENT
Start: 2025-02-08

## 2025-02-08 RX ORDER — SODIUM CHLORIDE, SODIUM LACTATE, POTASSIUM CHLORIDE, CALCIUM CHLORIDE 600; 310; 30; 20 MG/100ML; MG/100ML; MG/100ML; MG/100ML
75 INJECTION, SOLUTION INTRAVENOUS CONTINUOUS
Status: ACTIVE | OUTPATIENT
Start: 2025-02-08 | End: 2025-02-09

## 2025-02-08 RX ORDER — PENICILLIN G 3000000 [IU]/50ML
3 INJECTION, SOLUTION INTRAVENOUS EVERY 4 HOURS
Status: DISPENSED | OUTPATIENT
Start: 2025-02-08

## 2025-02-08 RX ORDER — ONDANSETRON 4 MG/1
4 TABLET, FILM COATED ORAL EVERY 6 HOURS PRN
Status: ACTIVE | OUTPATIENT
Start: 2025-02-08

## 2025-02-08 RX ORDER — NIFEDIPINE 10 MG/1
10 CAPSULE ORAL ONCE AS NEEDED
Status: ACTIVE | OUTPATIENT
Start: 2025-02-08

## 2025-02-08 RX ADMIN — MAGNESIUM SULFATE IN WATER 2 G/HR: 20 INJECTION, SOLUTION INTRAVENOUS at 22:37

## 2025-02-08 RX ADMIN — NIFEDIPINE 30 MG: 30 TABLET, FILM COATED, EXTENDED RELEASE ORAL at 22:00

## 2025-02-08 RX ADMIN — ACETAMINOPHEN 975 MG: 325 TABLET ORAL at 18:16

## 2025-02-08 RX ADMIN — METOCLOPRAMIDE 10 MG: 10 TABLET ORAL at 18:16

## 2025-02-08 RX ADMIN — FENTANYL CITRATE 50 MCG: 50 INJECTION INTRAMUSCULAR; INTRAVENOUS at 23:49

## 2025-02-08 RX ADMIN — DIPHENHYDRAMINE HYDROCHLORIDE 25 MG: 25 CAPSULE ORAL at 18:16

## 2025-02-08 RX ADMIN — PENICILLIN G POTASSIUM 5 MILLION UNITS: 5000000 INJECTION, POWDER, FOR SOLUTION INTRAMUSCULAR; INTRAVENOUS at 18:16

## 2025-02-08 RX ADMIN — SODIUM CHLORIDE, POTASSIUM CHLORIDE, SODIUM LACTATE AND CALCIUM CHLORIDE 75 ML/HR: 600; 310; 30; 20 INJECTION, SOLUTION INTRAVENOUS at 18:08

## 2025-02-08 RX ADMIN — PENICILLIN G 3 MILLION UNITS: 3000000 INJECTION, SOLUTION INTRAVENOUS at 22:00

## 2025-02-08 SDOH — ECONOMIC STABILITY: HOUSING INSECURITY: DO YOU FEEL UNSAFE GOING BACK TO THE PLACE WHERE YOU ARE LIVING?: NO

## 2025-02-08 SDOH — SOCIAL STABILITY: SOCIAL INSECURITY
WITHIN THE LAST YEAR, HAVE YOU BEEN RAPED OR FORCED TO HAVE ANY KIND OF SEXUAL ACTIVITY BY YOUR PARTNER OR EX-PARTNER?: NO

## 2025-02-08 SDOH — HEALTH STABILITY: MENTAL HEALTH: HOW OFTEN DO YOU HAVE SIX OR MORE DRINKS ON ONE OCCASION?: NEVER

## 2025-02-08 SDOH — ECONOMIC STABILITY: FOOD INSECURITY: HOW HARD IS IT FOR YOU TO PAY FOR THE VERY BASICS LIKE FOOD, HOUSING, MEDICAL CARE, AND HEATING?: NOT VERY HARD

## 2025-02-08 SDOH — HEALTH STABILITY: PHYSICAL HEALTH
HOW OFTEN DO YOU NEED TO HAVE SOMEONE HELP YOU WHEN YOU READ INSTRUCTIONS, PAMPHLETS, OR OTHER WRITTEN MATERIAL FROM YOUR DOCTOR OR PHARMACY?: SOMETIMES

## 2025-02-08 SDOH — SOCIAL STABILITY: SOCIAL INSECURITY: ABUSE SCREEN: ADULT

## 2025-02-08 SDOH — SOCIAL STABILITY: SOCIAL INSECURITY: WITHIN THE LAST YEAR, HAVE YOU BEEN AFRAID OF YOUR PARTNER OR EX-PARTNER?: NO

## 2025-02-08 SDOH — SOCIAL STABILITY: SOCIAL INSECURITY: HAVE YOU HAD THOUGHTS OF HARMING ANYONE ELSE?: NO

## 2025-02-08 SDOH — SOCIAL STABILITY: SOCIAL INSECURITY: ARE YOU OR HAVE YOU BEEN THREATENED OR ABUSED PHYSICALLY, EMOTIONALLY, OR SEXUALLY BY ANYONE?: NO

## 2025-02-08 SDOH — ECONOMIC STABILITY: FOOD INSECURITY: WITHIN THE PAST 12 MONTHS, YOU WORRIED THAT YOUR FOOD WOULD RUN OUT BEFORE YOU GOT THE MONEY TO BUY MORE.: NEVER TRUE

## 2025-02-08 SDOH — SOCIAL STABILITY: SOCIAL INSECURITY: DO YOU FEEL ANYONE HAS EXPLOITED OR TAKEN ADVANTAGE OF YOU FINANCIALLY OR OF YOUR PERSONAL PROPERTY?: NO

## 2025-02-08 SDOH — SOCIAL STABILITY: SOCIAL INSECURITY: WITHIN THE LAST YEAR, HAVE YOU BEEN HUMILIATED OR EMOTIONALLY ABUSED IN OTHER WAYS BY YOUR PARTNER OR EX-PARTNER?: NO

## 2025-02-08 SDOH — HEALTH STABILITY: MENTAL HEALTH: HAVE YOU USED ANY SUBSTANCES (CANABIS, COCAINE, HEROIN, HALLUCINOGENS, INHALANTS, ETC.) IN THE PAST 12 MONTHS?: NO

## 2025-02-08 SDOH — SOCIAL STABILITY: SOCIAL INSECURITY: DOES ANYONE TRY TO KEEP YOU FROM HAVING/CONTACTING OTHER FRIENDS OR DOING THINGS OUTSIDE YOUR HOME?: NO

## 2025-02-08 SDOH — ECONOMIC STABILITY: TRANSPORTATION INSECURITY: IN THE PAST 12 MONTHS, HAS LACK OF TRANSPORTATION KEPT YOU FROM MEDICAL APPOINTMENTS OR FROM GETTING MEDICATIONS?: NO

## 2025-02-08 SDOH — SOCIAL STABILITY: SOCIAL INSECURITY
WITHIN THE LAST YEAR, HAVE YOU BEEN KICKED, HIT, SLAPPED, OR OTHERWISE PHYSICALLY HURT BY YOUR PARTNER OR EX-PARTNER?: NO

## 2025-02-08 SDOH — SOCIAL STABILITY: SOCIAL INSECURITY: VERBAL ABUSE: DENIES

## 2025-02-08 SDOH — HEALTH STABILITY: MENTAL HEALTH: SUICIDAL BEHAVIOR (LIFETIME): NO

## 2025-02-08 SDOH — SOCIAL STABILITY: SOCIAL INSECURITY: ARE THERE ANY APPARENT SIGNS OF INJURIES/BEHAVIORS THAT COULD BE RELATED TO ABUSE/NEGLECT?: NO

## 2025-02-08 SDOH — SOCIAL STABILITY: SOCIAL INSECURITY: HAVE YOU HAD ANY THOUGHTS OF HARMING ANYONE ELSE?: NO

## 2025-02-08 SDOH — HEALTH STABILITY: MENTAL HEALTH: HOW MANY DRINKS CONTAINING ALCOHOL DO YOU HAVE ON A TYPICAL DAY WHEN YOU ARE DRINKING?: PATIENT DOES NOT DRINK

## 2025-02-08 SDOH — HEALTH STABILITY: MENTAL HEALTH: NON-SPECIFIC ACTIVE SUICIDAL THOUGHTS (PAST 1 MONTH): NO

## 2025-02-08 SDOH — HEALTH STABILITY: MENTAL HEALTH: WERE YOU ABLE TO COMPLETE ALL THE BEHAVIORAL HEALTH SCREENINGS?: YES

## 2025-02-08 SDOH — ECONOMIC STABILITY: FOOD INSECURITY: WITHIN THE PAST 12 MONTHS, THE FOOD YOU BOUGHT JUST DIDN'T LAST AND YOU DIDN'T HAVE MONEY TO GET MORE.: NEVER TRUE

## 2025-02-08 SDOH — SOCIAL STABILITY: SOCIAL INSECURITY: HAS ANYONE EVER THREATENED TO HURT YOUR FAMILY OR YOUR PETS?: NO

## 2025-02-08 SDOH — HEALTH STABILITY: MENTAL HEALTH: HAVE YOU USED ANY PRESCRIPTION DRUGS OTHER THAN PRESCRIBED IN THE PAST 12 MONTHS?: NO

## 2025-02-08 SDOH — HEALTH STABILITY: MENTAL HEALTH: HOW OFTEN DO YOU HAVE A DRINK CONTAINING ALCOHOL?: NEVER

## 2025-02-08 SDOH — HEALTH STABILITY: MENTAL HEALTH: WISH TO BE DEAD (PAST 1 MONTH): NO

## 2025-02-08 SDOH — SOCIAL STABILITY: SOCIAL INSECURITY: PHYSICAL ABUSE: DENIES

## 2025-02-08 ASSESSMENT — LIFESTYLE VARIABLES
HOW OFTEN DO YOU HAVE A DRINK CONTAINING ALCOHOL: NEVER
HOW OFTEN DO YOU HAVE 6 OR MORE DRINKS ON ONE OCCASION: NEVER
AUDIT-C TOTAL SCORE: 0
AUDIT-C TOTAL SCORE: 0
SKIP TO QUESTIONS 9-10: 1
HOW MANY STANDARD DRINKS CONTAINING ALCOHOL DO YOU HAVE ON A TYPICAL DAY: PATIENT DOES NOT DRINK
AUDIT-C TOTAL SCORE: 0
SKIP TO QUESTIONS 9-10: 1

## 2025-02-08 ASSESSMENT — PATIENT HEALTH QUESTIONNAIRE - PHQ9
2. FEELING DOWN, DEPRESSED OR HOPELESS: NOT AT ALL
SUM OF ALL RESPONSES TO PHQ9 QUESTIONS 1 & 2: 0
1. LITTLE INTEREST OR PLEASURE IN DOING THINGS: NOT AT ALL

## 2025-02-08 ASSESSMENT — PAIN SCALES - GENERAL
PAINLEVEL_OUTOF10: 0 - NO PAIN
PAINLEVEL_OUTOF10: 7
PAINLEVEL_OUTOF10: 0 - NO PAIN
PAINLEVEL_OUTOF10: 7
PAINLEVEL_OUTOF10: 0 - NO PAIN
PAINLEVEL_OUTOF10: 0 - NO PAIN

## 2025-02-08 ASSESSMENT — ACTIVITIES OF DAILY LIVING (ADL)
LACK_OF_TRANSPORTATION: NO
LACK_OF_TRANSPORTATION: NO

## 2025-02-08 ASSESSMENT — PAIN DESCRIPTION - PAIN TYPE: TYPE: ACUTE PAIN

## 2025-02-08 ASSESSMENT — PAIN DESCRIPTION - LOCATION: LOCATION: ABDOMEN

## 2025-02-08 ASSESSMENT — COLUMBIA-SUICIDE SEVERITY RATING SCALE - C-SSRS
6. HAVE YOU EVER DONE ANYTHING, STARTED TO DO ANYTHING, OR PREPARED TO DO ANYTHING TO END YOUR LIFE?: NO
1. IN THE PAST MONTH, HAVE YOU WISHED YOU WERE DEAD OR WISHED YOU COULD GO TO SLEEP AND NOT WAKE UP?: NO
2. HAVE YOU ACTUALLY HAD ANY THOUGHTS OF KILLING YOURSELF?: NO

## 2025-02-08 ASSESSMENT — PAIN - FUNCTIONAL ASSESSMENT: PAIN_FUNCTIONAL_ASSESSMENT: 0-10

## 2025-02-08 NOTE — H&P
OB Admission H&P    Assessment/Plan    Karely Sharp is a 34 y.o.  at 36w5d, PRADEEP: 3/3/2025, by 6w US admitted for elevated BP and plan for IOL    cHTN exacerbation vs siPEC w SF  - Known cHTN, has been taking her home meds (nifed 30 BID). Had a severe range BP at home and in ED x1, nonsustained. Now several high mild range BP, elevated compared to baseline  - On nifed 30 BID at home, last took this AM, continue  - Fu HELLP labs, P:C  - 7/10 HA, txt with T/B/R and reevaluate  - Has not yet met criteria for siPEC w SF but given severe range BP at late , recommend delivery. Pt agreeable with this    IOL  - Indicated for elevated BP in late  as above  - Admit to L&D, to be consented  - T&S, CBC, and syphilis screening on admission  - Epidural/pain control at patient request  - Pending cervical exam    Fetal Status  - NST reactive, reassuring   - Presentation: to be scanned  - EFW 2286g 32%, AC 24% on  US > extrapolated to 2700g  - GBS pos, for PCN  - Discussed late  steroids, pt declines    Postpartum  - Contraception Plan: to be discussed    Pregnancy otherwise notable for  - BMI 46  - No hx asthma or abdominal surgeries    D/w Dr. Diaz Diaz MD  PGY-4, Obstetrics and Gynecology    Pregnancy Problems (from 24 to present)       Problem Noted Diagnosed Resolved    Chronic hypertension affecting pregnancy ([facility]) 2025 by Rex Hope MD  No    Priority:  Medium       Overview Addendum 2025 10:42 AM by Rex Hope MD     : Blood pressure high mild range in the setting of chronic hypertension.  Will start patient on oral nifedipine XL 30 mg daily.  Patient to obtain ambulatory CBC/CMP/UPC today   : labs reviewed and WNL; BP today WNL on nifed 30mg daily  : /91, current medications today.  Will increase nifedipine to 30 mg twice daily. IOL scheduled for 37 weeks.         36 weeks gestation of pregnancy  ([facility]) 2024 by Lillian Montague, APRN-CNP  No    Priority:  Medium       Overview Addendum 2025  3:12 PM by Rex Hope MD     Desired provider in labor: [] CNM  [] Physician  [x] Blood Products: [x] Yes, accepts [] No, needs counseling  [x] Initial BMI: 41.28   [x] Prenatal Labs: Equivocal rubella  [x] Cervical Cancer Screening up to date: WNL  [x] Rh status: Rh+  [x] Genetic Screening: low risk XY  [x] NT US: (11-13 wks) late screen  [x] Baby ASA (if indicated): yes  [x] Pregnancy dated by: us er    [x] Anatomy US: (19-20 wks)    [] Federal Sterilization consent signed (if indicated):  [x] 1hr GCT at 24-28wks:   [x] Rhogam (if indicated): n/a  [x] Fetal Surveillance (if indicated): Weekly NSTs given chronic hypertension  [x] Tdap (27-32 wks, may be given up to 36 wks if initial window missed):   [] RSV (32-36 wks) (Sept. to end of ):   [] Flu Vaccine:    [] Breastfeeding:  [] Postpartum Birth control method:   [x] GBS at 36 - 37 wks: POS  [x] 39 weeks discussion of IOL vs. Expectant management: N/A  [x] Mode of delivery ( anticipated ): IOL at 37 weeks                   Subjective   Pt presents for severe BP at home. Reports 7/10 HA. No vision changes, SOB, CP, RUQ pain. Mild LE edema    Prenatal Provider Dr. Hope    OB History    Para Term  AB Living   5 1 1 0 3 1   SAB IAB Ectopic Multiple Live Births   0 3 0 0 1      # Outcome Date GA Lbr Bret/2nd Weight Sex Type Anes PTL Lv   5 Current            4 Term 13    M Vag-Spont   SHANAE   3 IAB            2 IAB            1 IAB               Obstetric Comments   Patient reports having to do some type of 24-hour urine test with her pregnancy in .  Does not remember having a diagnosis of preeclampsia.       Past Surgical History:   Procedure Laterality Date    NECK SURGERY      lymph node removal       Social History     Tobacco Use    Smoking status: Never    Smokeless tobacco: Never   Substance Use Topics    Alcohol use:  Not Currently       No Known Allergies    Medications Prior to Admission   Medication Sig Dispense Refill Last Dose/Taking    aspirin 81 mg chewable tablet Chew 1 tablet (81 mg) 2 times a day. 60 tablet 5 2/8/2025 Morning    NIFEdipine ER (Adalat CC) 30 mg 24 hr tablet Take 1 tablet (30 mg) by mouth once daily in the morning. Take before meals. Do not crush, chew, or split. 30 tablet 11 2/8/2025 Morning    prenatal vitamin calcium-iron-folic 27 mg iron- 1 mg tablet Take 1 tablet by mouth once daily. 90 tablet 3 2/8/2025 Morning     Objective     Last Vitals  Temp Pulse Resp BP MAP O2 Sat   36.7 °C (98.1 °F) 90 18 131/83 103 99 %     Blood Pressures         2/8/2025  1537 2/8/2025  1614 2/8/2025  1615 2/8/2025  1617 2/8/2025  1721    BP: 165/90 153/85 52/16 153/85 131/83          Physical Exam  General: NAD, mood appropriate  Cardiopulmonary: warm and well perfused, breathing comfortably on room air  Abdomen: gravid, non-tender  Extremities: symmetric  Speculum Exam: deferred  Cervix: exam to be done     Fetal Monitoring  NST: baseline 140, moderate variability, accels present, no decels   [place]m: irritable    BSUS: to be done    Labs in chart were reviewed.          Prenatal labs reviewed, GBS pos

## 2025-02-08 NOTE — CARE PLAN
The patient's goals for the shift include      The clinical goals for the shift include safe and healthy mom & baby

## 2025-02-09 ENCOUNTER — ANESTHESIA (OUTPATIENT)
Dept: OBSTETRICS AND GYNECOLOGY | Facility: HOSPITAL | Age: 35
End: 2025-02-09
Payer: MEDICAID

## 2025-02-09 ENCOUNTER — ANESTHESIA EVENT (OUTPATIENT)
Dept: OBSTETRICS AND GYNECOLOGY | Facility: HOSPITAL | Age: 35
End: 2025-02-09
Payer: MEDICAID

## 2025-02-09 VITALS
HEART RATE: 96 BPM | OXYGEN SATURATION: 93 % | BODY MASS INDEX: 46.33 KG/M2 | TEMPERATURE: 98.2 F | HEIGHT: 63 IN | WEIGHT: 261.47 LBS | SYSTOLIC BLOOD PRESSURE: 129 MMHG | RESPIRATION RATE: 16 BRPM | DIASTOLIC BLOOD PRESSURE: 67 MMHG

## 2025-02-09 LAB
ALBUMIN SERPL BCP-MCNC: 3.7 G/DL (ref 3.4–5)
ALP SERPL-CCNC: 240 U/L (ref 33–110)
ALT SERPL W P-5'-P-CCNC: 27 U/L (ref 7–45)
ANION GAP SERPL CALC-SCNC: 15 MMOL/L (ref 10–20)
AST SERPL W P-5'-P-CCNC: 30 U/L (ref 9–39)
BILIRUB SERPL-MCNC: 0.4 MG/DL (ref 0–1.2)
BUN SERPL-MCNC: 9 MG/DL (ref 6–23)
CALCIUM SERPL-MCNC: 8.5 MG/DL (ref 8.6–10.6)
CHLORIDE SERPL-SCNC: 102 MMOL/L (ref 98–107)
CO2 SERPL-SCNC: 21 MMOL/L (ref 21–32)
CREAT SERPL-MCNC: 0.71 MG/DL (ref 0.5–1.05)
EGFRCR SERPLBLD CKD-EPI 2021: >90 ML/MIN/1.73M*2
ERYTHROCYTE [DISTWIDTH] IN BLOOD BY AUTOMATED COUNT: 15.6 % (ref 11.5–14.5)
GLUCOSE SERPL-MCNC: 105 MG/DL (ref 74–99)
HCT VFR BLD AUTO: 42.4 % (ref 36–46)
HGB BLD-MCNC: 13.2 G/DL (ref 12–16)
MCH RBC QN AUTO: 25 PG (ref 26–34)
MCHC RBC AUTO-ENTMCNC: 31.1 G/DL (ref 32–36)
MCV RBC AUTO: 80 FL (ref 80–100)
NRBC BLD-RTO: 0 /100 WBCS (ref 0–0)
PLATELET # BLD AUTO: 303 X10*3/UL (ref 150–450)
POTASSIUM SERPL-SCNC: 4.4 MMOL/L (ref 3.5–5.3)
PROT SERPL-MCNC: 6.8 G/DL (ref 6.4–8.2)
RBC # BLD AUTO: 5.28 X10*6/UL (ref 4–5.2)
SODIUM SERPL-SCNC: 134 MMOL/L (ref 136–145)
TREPONEMA PALLIDUM IGG+IGM AB [PRESENCE] IN SERUM OR PLASMA BY IMMUNOASSAY: NONREACTIVE
WBC # BLD AUTO: 18 X10*3/UL (ref 4.4–11.3)

## 2025-02-09 PROCEDURE — 80053 COMPREHEN METABOLIC PANEL: CPT | Performed by: STUDENT IN AN ORGANIZED HEALTH CARE EDUCATION/TRAINING PROGRAM

## 2025-02-09 PROCEDURE — 2500000004 HC RX 250 GENERAL PHARMACY W/ HCPCS (ALT 636 FOR OP/ED): Mod: SE

## 2025-02-09 PROCEDURE — 7210000002 HC LABOR PER HOUR

## 2025-02-09 PROCEDURE — 51702 INSERT TEMP BLADDER CATH: CPT

## 2025-02-09 PROCEDURE — 10907ZC DRAINAGE OF AMNIOTIC FLUID, THERAPEUTIC FROM PRODUCTS OF CONCEPTION, VIA NATURAL OR ARTIFICIAL OPENING: ICD-10-PCS | Performed by: OBSTETRICS & GYNECOLOGY

## 2025-02-09 PROCEDURE — 3E033VJ INTRODUCTION OF OTHER HORMONE INTO PERIPHERAL VEIN, PERCUTANEOUS APPROACH: ICD-10-PCS | Performed by: OBSTETRICS & GYNECOLOGY

## 2025-02-09 PROCEDURE — 99199 UNLISTED SPECIAL SVC PX/RPRT: CPT

## 2025-02-09 PROCEDURE — 3700000014 EPIDURAL BLOCK: Performed by: ANESTHESIOLOGY

## 2025-02-09 PROCEDURE — 2500000004 HC RX 250 GENERAL PHARMACY W/ HCPCS (ALT 636 FOR OP/ED): Mod: SE | Performed by: STUDENT IN AN ORGANIZED HEALTH CARE EDUCATION/TRAINING PROGRAM

## 2025-02-09 PROCEDURE — 85027 COMPLETE CBC AUTOMATED: CPT | Performed by: STUDENT IN AN ORGANIZED HEALTH CARE EDUCATION/TRAINING PROGRAM

## 2025-02-09 PROCEDURE — 1120000001 HC OB PRIVATE ROOM DAILY

## 2025-02-09 PROCEDURE — 2500000002 HC RX 250 W HCPCS SELF ADMINISTERED DRUGS (ALT 637 FOR MEDICARE OP, ALT 636 FOR OP/ED): Mod: SE | Performed by: STUDENT IN AN ORGANIZED HEALTH CARE EDUCATION/TRAINING PROGRAM

## 2025-02-09 RX ORDER — FENTANYL/ROPIVACAINE/NS/PF 2MCG/ML-.2
0-25 PLASTIC BAG, INJECTION (ML) INJECTION CONTINUOUS
Status: DISPENSED | OUTPATIENT
Start: 2025-02-09

## 2025-02-09 RX ORDER — LIDOCAINE HYDROCHLORIDE AND EPINEPHRINE 15; 5 MG/ML; UG/ML
INJECTION, SOLUTION EPIDURAL AS NEEDED
Status: DISCONTINUED | OUTPATIENT
Start: 2025-02-09 | End: 2025-02-10

## 2025-02-09 RX ORDER — OXYTOCIN/0.9 % SODIUM CHLORIDE 30/500 ML
2-30 PLASTIC BAG, INJECTION (ML) INTRAVENOUS CONTINUOUS
Status: DISPENSED | OUTPATIENT
Start: 2025-02-09

## 2025-02-09 RX ORDER — NALBUPHINE HYDROCHLORIDE 10 MG/ML
10 INJECTION INTRAMUSCULAR; INTRAVENOUS; SUBCUTANEOUS ONCE
Status: COMPLETED | OUTPATIENT
Start: 2025-02-09 | End: 2025-02-09

## 2025-02-09 RX ORDER — FENTANYL CITRATE 50 UG/ML
25 INJECTION, SOLUTION INTRAMUSCULAR; INTRAVENOUS ONCE
Status: COMPLETED | OUTPATIENT
Start: 2025-02-09 | End: 2025-02-09

## 2025-02-09 RX ADMIN — NALBUPHINE HYDROCHLORIDE 10 MG: 10 INJECTION, SOLUTION INTRAMUSCULAR; INTRAVENOUS; SUBCUTANEOUS at 03:44

## 2025-02-09 RX ADMIN — PENICILLIN G 3 MILLION UNITS: 3000000 INJECTION, SOLUTION INTRAVENOUS at 02:03

## 2025-02-09 RX ADMIN — MAGNESIUM SULFATE IN WATER 2 G/HR: 20 INJECTION, SOLUTION INTRAVENOUS at 04:43

## 2025-02-09 RX ADMIN — PENICILLIN G 3 MILLION UNITS: 3000000 INJECTION, SOLUTION INTRAVENOUS at 14:31

## 2025-02-09 RX ADMIN — NIFEDIPINE 30 MG: 30 TABLET, FILM COATED, EXTENDED RELEASE ORAL at 21:54

## 2025-02-09 RX ADMIN — PENICILLIN G 3 MILLION UNITS: 3000000 INJECTION, SOLUTION INTRAVENOUS at 06:51

## 2025-02-09 RX ADMIN — Medication 2 MILLI-UNITS/MIN: at 02:05

## 2025-02-09 RX ADMIN — MAGNESIUM SULFATE IN WATER 2 G/HR: 20 INJECTION, SOLUTION INTRAVENOUS at 14:30

## 2025-02-09 RX ADMIN — MAGNESIUM SULFATE IN WATER 2 G/HR: 20 INJECTION, SOLUTION INTRAVENOUS at 23:52

## 2025-02-09 RX ADMIN — Medication 10 ML/HR: at 04:42

## 2025-02-09 RX ADMIN — PENICILLIN G 3 MILLION UNITS: 3000000 INJECTION, SOLUTION INTRAVENOUS at 10:30

## 2025-02-09 RX ADMIN — LIDOCAINE HYDROCHLORIDE AND EPINEPHRINE 3 ML: 15; 5 INJECTION, SOLUTION EPIDURAL at 04:34

## 2025-02-09 RX ADMIN — NIFEDIPINE 30 MG: 30 TABLET, FILM COATED, EXTENDED RELEASE ORAL at 09:45

## 2025-02-09 RX ADMIN — SODIUM CHLORIDE, POTASSIUM CHLORIDE, SODIUM LACTATE AND CALCIUM CHLORIDE 75 ML/HR: 600; 310; 30; 20 INJECTION, SOLUTION INTRAVENOUS at 17:34

## 2025-02-09 RX ADMIN — PENICILLIN G 3 MILLION UNITS: 3000000 INJECTION, SOLUTION INTRAVENOUS at 18:24

## 2025-02-09 RX ADMIN — PENICILLIN G 3 MILLION UNITS: 3000000 INJECTION, SOLUTION INTRAVENOUS at 21:55

## 2025-02-09 RX ADMIN — ONDANSETRON 4 MG: 2 INJECTION INTRAMUSCULAR; INTRAVENOUS at 14:02

## 2025-02-09 RX ADMIN — Medication 4 ML: at 04:41

## 2025-02-09 RX ADMIN — FENTANYL CITRATE 25 MCG: 50 INJECTION INTRAMUSCULAR; INTRAVENOUS at 00:26

## 2025-02-09 ASSESSMENT — PAIN SCALES - GENERAL
PAINLEVEL_OUTOF10: 0 - NO PAIN
PAINLEVEL_OUTOF10: 10 - WORST POSSIBLE PAIN
PAINLEVEL_OUTOF10: 0 - NO PAIN

## 2025-02-09 NOTE — PROGRESS NOTES
Intrapartum Progress Note    Assessment/Plan   Karely Sharp is a 34 y.o.  at 36w5d undergoing IOL for siPEC w SF    siPEC w/SF   - Known cHTN with recent med up-titration on  to nifed 30 BID; now diagnosed with siPEC w SF by severe range BP >4hrs apart  - HELLP labs wnl, P:C 0.16  - HA, now resolved  - Continue nifed 30 BID  - Mg 2g/hr, no si/sx toxicity    IOL   - S/p ripening, now AROM for minimal fluid, on pit  - CEFM, currently Cat I  - GBS pos, PCN infusing    Pregnancy Notables  - BMI 46  - No hx asthma or abdominal surgeries    Contraception: undecided    D/w Dr. Mehul Diaz MD  PGY-4, Obstetrics and Gynecology    Subjective   Feels well, comfortable with epidural. No HA, vision changes, SOB, CP, RUQ pain    Objective   Last Vitals:  Temp Pulse Resp BP MAP Pulse Ox   36.6 °C (97.9 °F) 95 18 123/89 103 98 %     Physical Examination:  General Appearance: no acute distress  Lungs: normal work of breathing  Heart: warm, well perfused  Abdomen: gravid  Musculoskeletal: normal ROM  Neurologic: no gross deficits  Psych exam: normal mood and affect    CEFM: baseline 125, moderate variability, accels present, no decels  [place]: irregular ctx    SVE: 450/-3, AROM minimal fluid    Lab Review:  Labs in chart were reviewed.

## 2025-02-09 NOTE — SIGNIFICANT EVENT
"Labor Progress Note    Subjective: To bedside for CRB placement    Objective:  Vitals: BP (!) 155/77   Pulse 82   Temp 36.1 °C (97 °F)   Resp 16   Ht 1.6 m (5' 3\")   Wt 119 kg (261 lb 7.5 oz)   LMP 05/03/2024 (Approximate)   SpO2 97%   BMI 46.32 kg/m²     CEFM: Baseline- 150bpm, mod variability, + accels, - decel  [place]: occasional ctx    Assessment/Plan:  IOL   -crb placed  -CEF, currently Cat I, for pit   -epidural as requested  -GBS +, PCN infusing    siPEC w/ SF   -diagnosed by non-sustained SRBPs and persistent elevated BPs above baseline in s/o recent outpt med uptitration on 1/29  -currently MRBPs  -asx  -HELLP labs wnl  -cont nifed 30 BID  -Mg infusing    Pt seen w/ Dr. Rena Siu MD  OBGYN, PGY-1       "

## 2025-02-09 NOTE — ANESTHESIA PREPROCEDURE EVALUATION
Patient: Karely Sharp    Evaluation Method: In-person visit    Procedure Information    Date: 02/09/25  Procedure: Labor Analgesia         Relevant Problems   Cardiac   (+) Chronic hypertension affecting pregnancy ([facility])      GYN   (+) 36 weeks gestation of pregnancy ([facility])       Clinical information reviewed:    Allergies  Meds               NPO Detail:  NPO/Void Status  Date of Last Liquid: 02/08/25  Time of Last Liquid: 1300  Date of Last Solid: 02/08/25  Time of Last Solid: 1300         OB/Gyn Evaluation    Present Pregnancy    Patient is pregnant now.  (+) , hypertensive disorder of pregnancy   Obstetric History                Physical Exam    Airway  Mallampati: III  TM distance: <3 FB  Neck ROM: full     Cardiovascular   Rhythm: regular  Rate: normal     Dental    Pulmonary   Breath sounds clear to auscultation     Abdominal - normal exam             Anesthesia Plan    History of general anesthesia?: yes  History of complications of general anesthesia?: no    ASA 3     epidural     Anesthetic plan and risks discussed with patient.  Use of blood products discussed with patient who consented to blood products.    Plan discussed with attending.

## 2025-02-09 NOTE — PROGRESS NOTES
Intrapartum Progress Note    Assessment/Plan   Karely Sharp is a 34 y.o.  at 36w5d. PRADEEP: 3/3/2025, by Ultrasound, admitted for elevated BP in s/o of known cHTN.     siPEC w/SF   -known cHTN with recent med up-titration on  to nifed 30 BID  -had SRBP at home and in ED x1, nonsustained. Normotensive to high MRBPs here  -HELLP labs wnl, P:C 0.16   -HA resolved   -initially considering cHTN exacerbation as pt not strictly meeting siPEC w/ SF criteria. However upon further discussion with staff attending, will now diagnose siPEC w/ SF considering that pt required med up titration in the late 3rd trimester with continued poor BP control. Recent up-titration likely masking full picture   -will initiate Mg at this time and proceed with IOL     IOL   -scanned (cephalic) and consented   -SVE as below, will initiate IOL with crb and pit   -CEFM, currently Cat I  -GBS pos, PCN infusing    Pregnancy Notables  - BMI 46  - No hx asthma or abdominal surgeries    Contraception: undecided    Pt seen & d/w Dr. Tyson Siu MD  OBGYN, PGY-1   Assessment & Plan  Chronic hypertension affecting pregnancy ([facility])    Pregnancy Problems (from 24 to present)       Problem Noted Diagnosed Resolved    Chronic hypertension affecting pregnancy ([facility]) 2025 by Rex Hope MD  No    Priority:  Medium       Overview Addendum 2025 10:42 AM by Rex Hope MD     : Blood pressure high mild range in the setting of chronic hypertension.  Will start patient on oral nifedipine XL 30 mg daily.  Patient to obtain ambulatory CBC/CMP/UPC today   : labs reviewed and WNL; BP today WNL on nifed 30mg daily  : /91, current medications today.  Will increase nifedipine to 30 mg twice daily. IOL scheduled for 37 weeks.         36 weeks gestation of pregnancy ([facility]) 2024 by DOV Juarez-CNP  No    Priority:  Medium       Overview Addendum 2025  3:12 PM by Rex  MD Jayy     Desired provider in labor: [] CNM  [] Physician  [x] Blood Products: [x] Yes, accepts [] No, needs counseling  [x] Initial BMI: 41.28   [x] Prenatal Labs: Equivocal rubella  [x] Cervical Cancer Screening up to date: WNL  [x] Rh status: Rh+  [x] Genetic Screening: low risk XY  [x] NT US: (11-13 wks) late screen  [x] Baby ASA (if indicated): yes  [x] Pregnancy dated by: us er    [x] Anatomy US: (19-20 wks)    [] Federal Sterilization consent signed (if indicated):  [x] 1hr GCT at 24-28wks:   [x] Rhogam (if indicated): n/a  [x] Fetal Surveillance (if indicated): Weekly NSTs given chronic hypertension  [x] Tdap (27-32 wks, may be given up to 36 wks if initial window missed):   [] RSV (32-36 wks) (Sept. to end of Jan):   [] Flu Vaccine:    [] Breastfeeding:  [] Postpartum Birth control method:   [x] GBS at 36 - 37 wks: POS  [x] 39 weeks discussion of IOL vs. Expectant management: N/A  [x] Mode of delivery ( anticipated ): IOL at 37 weeks                   Subjective   Pt doing well at this time. HA resolved. Denies CP/SOB, vision changes, RUQ pain, or new swelling different from baseline    Objective   Last Vitals:  Temp Pulse Resp BP MAP Pulse Ox   36.9 °C (98.4 °F) 99 16 118/56 81 99 %     Physical Examination:  General Appearance: no acute distress  Lungs: normal work of breathing  Heart: warm, well perfused  Abdomen: gravid  Musculoskeletal: normal ROM  Neurologic: no gross deficits  Psych exam: normal mood and affect    CEFM: Baseline- 130bpm, mod variability, + accels, - decel  [place]: occasional ctx    SVE: 0/50/-3    Lab Review:  Labs in chart were reviewed.

## 2025-02-09 NOTE — ANESTHESIA PROCEDURE NOTES
Epidural Block    Patient location during procedure: OB  Start time: 2/9/2025 4:22 AM  End time: 2/9/2025 4:55 AM  Reason for block: labor analgesia  Staffing  Performed: resident   Authorized by: Kendall Osborn MD    Performed by: Kendall Osborn MD    Preanesthetic Checklist  Completed: patient identified, IV checked, risks and benefits discussed, surgical consent, monitors and equipment checked, pre-op evaluation, timeout performed and sterile techniques followed  Block Timeout  RN/Licensed healthcare professional reads aloud to the Anesthesia provider and entire team: Patient identity, procedure with side and site, patient position, and as applicable the availability of implants/special equipment/special requirements.  Patient on coagulant treatment: no  Timeout performed at: 2/9/2025 4:22 AM  Block Placement  Patient position: sitting  Prep: ChloraPrep  Sterility prep: cap, drape, gloves and mask  Sedation level: no sedation  Patient monitoring: blood pressure, continuous pulse oximetry and heart rate  Approach: midline  Local numbing: lidocaine 1% to skin and subcutaneous tissues  Vertebral space: lumbar  Lumbar location: L3-L4  Epidural  Loss of resistance technique: saline  Guidance: landmark technique        Needle  Needle type: Tuohy   Needle gauge: 17  Needle length: 8.9cm  Needle insertion depth: 7 cm  Catheter type: multi-orifice  Catheter size: 20 G  Catheter at skin depth: 12 cm  Catheter securement method: clear occlusive dressing and liquid medical adhesive    Test dose: lidocaine 1.5% with epinephrine 1-to-200,000  Test dose: lidocaine 1.5% with epinephrine 1-to-200,000  Test dose result: no positive test dose    PCEA  Medication concentration used: 0.2% Ropivacaine with 2 mcg/mL Fentanyl  Dose (mL): 5  Lockout (minutes): 30  1-Hour Limit (boluses/hr): 2  Basal Rate: 10        Assessment  Sensory level: T10 bilateral  Block outcome: patient comfortable  Number of attempts: 1  Events: no positive  test dose

## 2025-02-09 NOTE — SIGNIFICANT EVENT
Labor check    S: Patient resting comfortably.     SVE: /60/-2  FHT: 130/mod/+accle/-decel  [place]: q2-3min    A/P: Karely is a 34 y.o.  at 36w6d undergoing IOL     IOL  - latent labor  - Pitocin per protocol  - GBS pos, on PCN ppx   - epidural infusing    siPEC w/SF   - Known cHTN with recent med up-titration on  to nifed 30 BID; now diagnosed with siPEC w SF by severe range BP >4hrs apart  - HELLP labs wnl, P:C 0.16  - HA, now resolved  - Continue nifed 30 BID  - Mg 2g/hr, no si/sx toxicity    Fetal Wellbeing  - CEFM, Cat I currently     Discussed with Dr. Emily Alexis MD  PGY1, Obstetrics and Gynecology

## 2025-02-10 LAB
BASE EXCESS BLDCOV CALC-SCNC: -6.8 MMOL/L (ref -8.1–-0.5)
BODY TEMPERATURE: 37 DEGREES CELSIUS
HCO3 BLDCOV-SCNC: 23.5 MMOL/L (ref 16–26)
INHALED O2 CONCENTRATION: 21 %
OXYHGB MFR BLDCOV: 15.2 % (ref 94–98)
PCO2 BLDCOV: 69 MM HG (ref 22–53)
PH BLDCOV: 7.14 PH (ref 7.19–7.47)
PO2 BLDCOV: 15 MM HG (ref 13–37)
SAO2 % BLDCOV: 15 % (ref 16–84)

## 2025-02-10 PROCEDURE — 7100000016 HC LABOR RECOVERY PER HOUR: Performed by: OBSTETRICS & GYNECOLOGY

## 2025-02-10 PROCEDURE — 3700000014 HC AN EPIDURAL BLOCK CHARGE: Performed by: OBSTETRICS & GYNECOLOGY

## 2025-02-10 PROCEDURE — 59514 CESAREAN DELIVERY ONLY: CPT | Performed by: OBSTETRICS & GYNECOLOGY

## 2025-02-10 PROCEDURE — 1100000001 HC PRIVATE ROOM DAILY

## 2025-02-10 PROCEDURE — 2500000004 HC RX 250 GENERAL PHARMACY W/ HCPCS (ALT 636 FOR OP/ED): Mod: SE | Performed by: STUDENT IN AN ORGANIZED HEALTH CARE EDUCATION/TRAINING PROGRAM

## 2025-02-10 PROCEDURE — 7210000002 HC LABOR PER HOUR

## 2025-02-10 PROCEDURE — 2500000004 HC RX 250 GENERAL PHARMACY W/ HCPCS (ALT 636 FOR OP/ED): Mod: SE | Performed by: NURSE ANESTHETIST, CERTIFIED REGISTERED

## 2025-02-10 PROCEDURE — 2500000001 HC RX 250 WO HCPCS SELF ADMINISTERED DRUGS (ALT 637 FOR MEDICARE OP): Mod: SE | Performed by: STUDENT IN AN ORGANIZED HEALTH CARE EDUCATION/TRAINING PROGRAM

## 2025-02-10 PROCEDURE — 7100000016 HC LABOR RECOVERY PER HOUR

## 2025-02-10 PROCEDURE — 2500000004 HC RX 250 GENERAL PHARMACY W/ HCPCS (ALT 636 FOR OP/ED): Mod: JZ,SE | Performed by: STUDENT IN AN ORGANIZED HEALTH CARE EDUCATION/TRAINING PROGRAM

## 2025-02-10 PROCEDURE — 82805 BLOOD GASES W/O2 SATURATION: CPT | Performed by: STUDENT IN AN ORGANIZED HEALTH CARE EDUCATION/TRAINING PROGRAM

## 2025-02-10 PROCEDURE — 2500000005 HC RX 250 GENERAL PHARMACY W/O HCPCS: Mod: SE | Performed by: NURSE ANESTHETIST, CERTIFIED REGISTERED

## 2025-02-10 PROCEDURE — 99199 UNLISTED SPECIAL SVC PX/RPRT: CPT

## 2025-02-10 PROCEDURE — 88307 TISSUE EXAM BY PATHOLOGIST: CPT | Mod: TC,SUR | Performed by: OBSTETRICS & GYNECOLOGY

## 2025-02-10 PROCEDURE — 59050 FETAL MONITOR W/REPORT: CPT

## 2025-02-10 PROCEDURE — 2500000004 HC RX 250 GENERAL PHARMACY W/ HCPCS (ALT 636 FOR OP/ED): Mod: SE

## 2025-02-10 PROCEDURE — 2500000002 HC RX 250 W HCPCS SELF ADMINISTERED DRUGS (ALT 637 FOR MEDICARE OP, ALT 636 FOR OP/ED): Mod: SE | Performed by: STUDENT IN AN ORGANIZED HEALTH CARE EDUCATION/TRAINING PROGRAM

## 2025-02-10 PROCEDURE — 2720000007 HC OR 272 NO HCPCS: Performed by: OBSTETRICS & GYNECOLOGY

## 2025-02-10 RX ORDER — FENTANYL CITRATE 50 UG/ML
INJECTION, SOLUTION INTRAMUSCULAR; INTRAVENOUS AS NEEDED
Status: DISCONTINUED | OUTPATIENT
Start: 2025-02-10 | End: 2025-02-10

## 2025-02-10 RX ORDER — FAMOTIDINE 10 MG/ML
INJECTION INTRAVENOUS AS NEEDED
Status: DISCONTINUED | OUTPATIENT
Start: 2025-02-10 | End: 2025-02-10

## 2025-02-10 RX ORDER — ACETAMINOPHEN 325 MG/1
975 TABLET ORAL EVERY 6 HOURS
Status: DISCONTINUED | OUTPATIENT
Start: 2025-02-10 | End: 2025-02-13 | Stop reason: HOSPADM

## 2025-02-10 RX ORDER — CEFAZOLIN 1 G/1
INJECTION, POWDER, FOR SOLUTION INTRAVENOUS AS NEEDED
Status: DISCONTINUED | OUTPATIENT
Start: 2025-02-10 | End: 2025-02-10

## 2025-02-10 RX ORDER — OXYCODONE HYDROCHLORIDE 5 MG/1
10 TABLET ORAL EVERY 4 HOURS PRN
Status: DISCONTINUED | OUTPATIENT
Start: 2025-02-11 | End: 2025-02-13 | Stop reason: HOSPADM

## 2025-02-10 RX ORDER — HYDROMORPHONE HYDROCHLORIDE 0.2 MG/ML
0.2 INJECTION INTRAMUSCULAR; INTRAVENOUS; SUBCUTANEOUS EVERY 5 MIN PRN
Status: DISCONTINUED | OUTPATIENT
Start: 2025-02-10 | End: 2025-02-13 | Stop reason: HOSPADM

## 2025-02-10 RX ORDER — KETOROLAC TROMETHAMINE 30 MG/ML
INJECTION, SOLUTION INTRAMUSCULAR; INTRAVENOUS AS NEEDED
Status: DISCONTINUED | OUTPATIENT
Start: 2025-02-10 | End: 2025-02-10

## 2025-02-10 RX ORDER — IBUPROFEN 600 MG/1
600 TABLET ORAL EVERY 6 HOURS
Status: DISCONTINUED | OUTPATIENT
Start: 2025-02-11 | End: 2025-02-13 | Stop reason: HOSPADM

## 2025-02-10 RX ORDER — PHENYLEPHRINE HCL IN 0.9% NACL 0.4MG/10ML
SYRINGE (ML) INTRAVENOUS AS NEEDED
Status: DISCONTINUED | OUTPATIENT
Start: 2025-02-10 | End: 2025-02-10

## 2025-02-10 RX ORDER — MORPHINE SULFATE 0.5 MG/ML
INJECTION, SOLUTION EPIDURAL; INTRATHECAL; INTRAVENOUS AS NEEDED
Status: DISCONTINUED | OUTPATIENT
Start: 2025-02-10 | End: 2025-02-10

## 2025-02-10 RX ORDER — KETOROLAC TROMETHAMINE 30 MG/ML
30 INJECTION, SOLUTION INTRAMUSCULAR; INTRAVENOUS EVERY 6 HOURS
Status: DISPENSED | OUTPATIENT
Start: 2025-02-10 | End: 2025-02-11

## 2025-02-10 RX ORDER — OXYCODONE HYDROCHLORIDE 5 MG/1
5 TABLET ORAL EVERY 4 HOURS PRN
Status: DISCONTINUED | OUTPATIENT
Start: 2025-02-11 | End: 2025-02-13 | Stop reason: HOSPADM

## 2025-02-10 RX ORDER — ENOXAPARIN SODIUM 100 MG/ML
60 INJECTION SUBCUTANEOUS EVERY 24 HOURS
Status: DISCONTINUED | OUTPATIENT
Start: 2025-02-11 | End: 2025-02-13 | Stop reason: HOSPADM

## 2025-02-10 RX ORDER — NALOXONE HYDROCHLORIDE 0.4 MG/ML
0.1 INJECTION, SOLUTION INTRAMUSCULAR; INTRAVENOUS; SUBCUTANEOUS EVERY 5 MIN PRN
Status: DISCONTINUED | OUTPATIENT
Start: 2025-02-10 | End: 2025-02-13 | Stop reason: HOSPADM

## 2025-02-10 RX ORDER — HYDROMORPHONE HYDROCHLORIDE 1 MG/ML
0.2 INJECTION, SOLUTION INTRAMUSCULAR; INTRAVENOUS; SUBCUTANEOUS EVERY 5 MIN PRN
OUTPATIENT
Start: 2025-02-10

## 2025-02-10 RX ORDER — METOCLOPRAMIDE HYDROCHLORIDE 5 MG/ML
INJECTION INTRAMUSCULAR; INTRAVENOUS AS NEEDED
Status: DISCONTINUED | OUTPATIENT
Start: 2025-02-10 | End: 2025-02-10

## 2025-02-10 RX ORDER — CARBOPROST TROMETHAMINE 250 UG/ML
INJECTION, SOLUTION INTRAMUSCULAR AS NEEDED
Status: DISCONTINUED | OUTPATIENT
Start: 2025-02-10 | End: 2025-02-10

## 2025-02-10 RX ADMIN — NIFEDIPINE 30 MG: 30 TABLET, FILM COATED, EXTENDED RELEASE ORAL at 09:06

## 2025-02-10 RX ADMIN — Medication 160 MCG: at 16:05

## 2025-02-10 RX ADMIN — Medication 7 ML/HR: at 13:31

## 2025-02-10 RX ADMIN — PENICILLIN G 3 MILLION UNITS: 3000000 INJECTION, SOLUTION INTRAVENOUS at 06:14

## 2025-02-10 RX ADMIN — DEXAMETHASONE SODIUM PHOSPHATE 8 MG: 4 INJECTION, SOLUTION INTRA-ARTICULAR; INTRALESIONAL; INTRAMUSCULAR; INTRAVENOUS; SOFT TISSUE at 16:52

## 2025-02-10 RX ADMIN — PENICILLIN G 3 MILLION UNITS: 3000000 INJECTION, SOLUTION INTRAVENOUS at 02:31

## 2025-02-10 RX ADMIN — NIFEDIPINE 30 MG: 30 TABLET, FILM COATED, EXTENDED RELEASE ORAL at 20:29

## 2025-02-10 RX ADMIN — FENTANYL CITRATE 100 MCG: 50 INJECTION, SOLUTION INTRAMUSCULAR; INTRAVENOUS at 15:59

## 2025-02-10 RX ADMIN — Medication 80 MCG: at 16:13

## 2025-02-10 RX ADMIN — Medication 120 MCG: at 16:15

## 2025-02-10 RX ADMIN — LOPERAMIDE HYDROCHLORIDE 4 MG: 2 CAPSULE ORAL at 20:29

## 2025-02-10 RX ADMIN — OXYTOCIN 600 MILLI-UNITS/MIN: 10 INJECTION, SOLUTION INTRAMUSCULAR; INTRAVENOUS at 16:16

## 2025-02-10 RX ADMIN — LOPERAMIDE HYDROCHLORIDE 4 MG: 2 CAPSULE ORAL at 17:47

## 2025-02-10 RX ADMIN — CEFAZOLIN 3 G: 1 INJECTION, POWDER, FOR SOLUTION INTRAMUSCULAR; INTRAVENOUS at 16:02

## 2025-02-10 RX ADMIN — MAGNESIUM SULFATE IN WATER 2 G/HR: 20 INJECTION, SOLUTION INTRAVENOUS at 18:45

## 2025-02-10 RX ADMIN — PENICILLIN G 3 MILLION UNITS: 3000000 INJECTION, SOLUTION INTRAVENOUS at 10:17

## 2025-02-10 RX ADMIN — FAMOTIDINE 10 MG: 10 INJECTION INTRAVENOUS at 16:11

## 2025-02-10 RX ADMIN — HYDROMORPHONE HYDROCHLORIDE 0.2 MG: 0.2 INJECTION, SOLUTION INTRAMUSCULAR; INTRAVENOUS; SUBCUTANEOUS at 21:01

## 2025-02-10 RX ADMIN — MAGNESIUM SULFATE IN WATER 2 G/HR: 20 INJECTION, SOLUTION INTRAVENOUS at 09:31

## 2025-02-10 RX ADMIN — ONDANSETRON 4 MG: 2 INJECTION INTRAMUSCULAR; INTRAVENOUS at 16:11

## 2025-02-10 RX ADMIN — SODIUM CHLORIDE, SODIUM LACTATE, POTASSIUM CHLORIDE, AND CALCIUM CHLORIDE: 600; 310; 30; 20 INJECTION, SOLUTION INTRAVENOUS at 15:50

## 2025-02-10 RX ADMIN — KETOROLAC TROMETHAMINE 30 MG: 30 INJECTION, SOLUTION INTRAMUSCULAR; INTRAVENOUS at 16:52

## 2025-02-10 RX ADMIN — AZITHROMYCIN 500 MG: 500 INJECTION, POWDER, LYOPHILIZED, FOR SOLUTION INTRAVENOUS at 16:02

## 2025-02-10 RX ADMIN — Medication 240 MCG: at 16:07

## 2025-02-10 RX ADMIN — MORPHINE SULFATE 2.5 MG: 0.5 INJECTION EPIDURAL; INTRATHECAL; INTRAVENOUS at 16:52

## 2025-02-10 RX ADMIN — SODIUM CHLORIDE, SODIUM LACTATE, POTASSIUM CHLORIDE, AND CALCIUM CHLORIDE: 600; 310; 30; 20 INJECTION, SOLUTION INTRAVENOUS at 16:03

## 2025-02-10 RX ADMIN — Medication 1 ML: at 13:39

## 2025-02-10 RX ADMIN — CARBOPROST TROMETHAMINE 250 MCG: 250 INJECTION, SOLUTION INTRAMUSCULAR at 16:19

## 2025-02-10 RX ADMIN — Medication 30 MILLI-UNITS/MIN: at 06:14

## 2025-02-10 RX ADMIN — METOCLOPRAMIDE 10 MG: 5 INJECTION, SOLUTION INTRAMUSCULAR; INTRAVENOUS at 16:02

## 2025-02-10 RX ADMIN — FAMOTIDINE 20 MG: 10 INJECTION INTRAVENOUS at 16:13

## 2025-02-10 ASSESSMENT — PAIN SCALES - GENERAL
PAINLEVEL_OUTOF10: 0 - NO PAIN
PAIN_LEVEL: 0
PAINLEVEL_OUTOF10: 0 - NO PAIN

## 2025-02-10 NOTE — PROGRESS NOTES
Postpartum Progress Note    Assessment/Plan   35yo  s/p pCS on 2/10 for failed IOL for siPEC w/ SF    Postpartum  - pCS 2/10, intraoperative atony noted, 2nd pit bolus, hemabate, and B-martinez placed  - , fu POD1 CBC  - Routine postoperative care    siPEC w/SF   - Known cHTN with recent med up-titration on  to nifed 30 BID; diagnosed with siPEC w SF by severe range BP >4hrs apart  - HELLP labs wnl, P:C 0.16  - asymp  - currently normotensive to mild range  - Continue nifed 30 BID  - Mg for 24 hrs PP, no si/sx toxicity     Pt seen and d/w Dr. Cory Iverson MD PGY-3      Subjective   Patient resting comfortably in bed. Denies HA, scotoma, RUQ pain, SOB, chest pain. States abdominal pain is well controlled.      Objective   Allergies:   Patient has no known allergies.         Last Vitals:  Temp Pulse Resp BP MAP Pulse Ox   36.2 °C (97.2 °F) 83 18 137/69 (R3) 96 100 %     Vitals Min/Max Last 24 Hours:  Temp  Min: 36.2 °C (97.2 °F)  Max: 36.9 °C (98.4 °F)  Pulse  Min: 76  Max: 113  Resp  Min: 16  Max: 18  BP  Min: 111/59  Max: 181/85  MAP (mmHg)  Min: 73  Max: 122    Intake/Output:     Intake/Output Summary (Last 24 hours) at 2/10/2025 1821  Last data filed at 2/10/2025 1752  Gross per 24 hour   Intake 4033.22 ml   Output 3490 ml   Net 543.22 ml       Physical Exam:  Constitutional: No visible distress, alert and cooperative  Respiratory/Thorax: Normal respiratory effort on RA. Lungs CTAB.  Cardiovascular: RRR  Gastrointestinal: soft, nondistended, nontender  Neurological: 2+ DTR in bilat UE  Psychological: Appropriate mood and behavior    Lab Data:  Lab Results   Component Value Date    WBC 18.0 (H) 2025    HGB 13.2 2025    HCT 42.4 2025     2025     Lab Results   Component Value Date    GLUCOSE 105 (H) 2025     (L) 2025    K 4.4 2025     2025    CO2 21 2025    ANIONGAP 15 2025    BUN 9 2025    CREATININE  0.71 02/09/2025    EGFR >90 02/09/2025    CALCIUM 8.5 (L) 02/09/2025    ALBUMIN 3.7 02/09/2025    PROT 6.8 02/09/2025    ALKPHOS 240 (H) 02/09/2025    ALT 27 02/09/2025    AST 30 02/09/2025    BILITOT 0.4 02/09/2025

## 2025-02-10 NOTE — SIGNIFICANT EVENT
To bedside by patient request. Patient feeling very frustrated with labor course. Feeling like she would like a  birth if not progressing at time of next exam. Patient and partner had questions regarding possible  delivery including risks and recovery time. Discussed future fertility goals. Discussed risk of  birth including bleeding, infection, injury to nearby structures including bowel, bladder, blood vessels and nerves. Discussed recovery usually is longer and can be more painful although most people do pretty well with just IBU and Tylenol (and oxy PRN). All questions were answered. Will continue discussion at time of next exam at 1300.     FHT: discontinuous, 120/mod/+accel/-decel  [place]: q2min when being picked up by toco    Discussed with Dr. Juan and Dr. Adeline Alexis MD  PGY1, Obstetrics and Gynecology

## 2025-02-10 NOTE — ANESTHESIA POSTPROCEDURE EVALUATION
Patient: Karely Sharp    Procedure Summary       Date: 25 Room / Location: [facility] 03 / Virtual [facility] 2 OB    Anesthesia Start: 422 Anesthesia Stop: 02/10/25 1740    Procedure:  DELIVERY Diagnosis: (Failure to Progress)    Surgeons: Corinne A Bazella, MD Responsible Provider: Elli Flowers MD    Anesthesia Type: epidural ASA Status: 3            Anesthesia Type: epidural    Vitals Value Taken Time   /65 02/10/25 1737   Temp 36.2 °C (97.2 °F) 02/10/25 1737   Pulse 88 02/10/25 173   Resp 18 02/10/25 1741   SpO2 98 % 02/10/25 1737       Anesthesia Post Evaluation    Patient location during evaluation: bedside  Patient participation: complete - patient participated  Level of consciousness: awake  Pain score: 0  Pain management: adequate  Airway patency: patent  Cardiovascular status: acceptable  Respiratory status: acceptable  Hydration status: acceptable  Postoperative Nausea and Vomiting: none  Comments: Epidural capped and secured        There were no known notable events for this encounter.

## 2025-02-10 NOTE — PROGRESS NOTES
Intrapartum Progress Note    Assessment/Plan   Karely Sharp is a 34 y.o.  at 36w6d, PRADEEP: 3/3/2025, by Ultrasound admitted for IOL in the setting of siPEC with severe features    IOL  Method: s/p ripening, pitocin  Pain management: epidural infusing  GBS: positive on PCN  Next exam: as clinically indicated for maternal or fetal changes   Discussed with patient now 12hrs of pitocin and AROM without making it to active labor. Will continue to monitor for cervical change of the next 6 hours or so but if she does not continue to dilate then will likely recommend a  birth.     siPEC w/SF   - Known cHTN with recent med up-titration on  to nifed 30 BID; now diagnosed with siPEC w SF by severe range BP >4hrs apart  - HELLP labs wnl, P:C 0.16  - asymp  - currently normotensive  - Continue nifed 30 BID  - Mg 2g/hr, no si/sx toxicity    Fetal Status   -CEFM, currently Category I    Pregnancy Notables  - BMI 46  - No hx asthma or abdominal surgeries    Discussed wit hand to be seen by Dr. Adeline Alexis MD  PGY1, Obstetrics and Gynecology    Subjective   Resting comfortably. Feeling exhausted. Denies headache, chest pain, SOB, RUQ pain, vision changes.     Objective   Last Vitals:  Temp Pulse Resp BP MAP Pulse Ox   36.8 °C (98.2 °F) 101 16 133/73   100 %     Blood Pressures         2/10/2025  0431 2/10/2025  0525 2/10/2025  0633 2/10/2025  0731 2/10/2025  0828    BP: 127/66 130/60 136/79 126/76 133/73              Vitals Min/Max Last 24 Hours:  Temp  Min: 36 °C (96.8 °F)  Max: 36.8 °C (98.2 °F)  Pulse  Min: 82  Max: 112  Resp  Min: 16  Max: 18  BP  Min: 111/59  Max: 181/85    Intake/Output:    Intake/Output Summary (Last 24 hours) at 2/10/2025 0917  Last data filed at 2/10/2025 0830  Gross per 24 hour   Intake 2454.59 ml   Output 3222 ml   Net -767.41 ml       Physical Examination:  General: no acute distress  HEENT: normocephalic, atraumatic  Heart: warm and well perfused  Lungs: breathing  comfortably on room air  Abdomen: gravid  Extremities: moving all extremities  Neuro: awake and conversant  Psych: appropriate mood and affect    SVE: 5/50/-3  FHT: 130/mod/+accel/-decel  [place]: q3-4min    Lab Review:  Labs in chart have been reviewed    Lab Results   Component Value Date    WBC 18.0 (H) 02/09/2025    HGB 13.2 02/09/2025    HCT 42.4 02/09/2025     02/09/2025    ALT 27 02/09/2025    AST 30 02/09/2025     (L) 02/09/2025    K 4.4 02/09/2025     02/09/2025    CREATININE 0.71 02/09/2025    BUN 9 02/09/2025    CO2 21 02/09/2025    HGBA1C 5.6 09/19/2024         spontaneous

## 2025-02-10 NOTE — SIGNIFICANT EVENT
Decision for  Birth     Patient is now > 18hrs s/p AROM and on pitocin (currently on max pitocin) without cervical change. Discussed with patient recommendation to proceed with  birth at this time given would expect her to be in active labor. Patient agreeable to proceed with  delivery. Discussed risks of a  birth including bleeding, infection, injury to nearby structures including bowel, bladder, blood vessels and nerves, injury to baby, and hysterectomy. Discussed what to expect in the OR. Pitocin turned off. FHT: 120/mod/+accel/-decel, ctrx q2-4min. Will proceed with non-scheduled non-urgent  birth for failed induction of labor, failure to progress in first stage. Anesthesia and charge nurse notified.     Seen and discussed with Dr. Adeline Alexis MD  PGY1, Obstetrics and Gynecology

## 2025-02-10 NOTE — SIGNIFICANT EVENT
To bedside for cervical exam and reattempt at AROM    SVE 4/50/-3, AROM performed with copious clear fluid  /mod/+accel/-decel  [place]: irregular    - Suspect that patient was not ruptured prior to now given no leaking on pad and clear gush of fluid now. Will change AROM time to now  - Epidural infusing  - GBS pos, PCN  - Continue pitocin per protocol    Mona Reid MD  PGY-4, Obstetrics and Gynecology

## 2025-02-10 NOTE — SIGNIFICANT EVENT
"Labor Progress Note    Subjective: Feeling more pressure, requesting exam.    Objective:  Vitals: /86   Pulse 99   Temp 36.8 °C (98.2 °F) (Oral)   Resp 16   Ht 1.6 m (5' 3\")   Wt 119 kg (261 lb 7.5 oz)   LMP 05/03/2024 (Approximate)   SpO2 99%   BMI 46.32 kg/m²     SVE: 5/50/-3  FHT: 130/moderate/+accels/-decels    [place]: q 2     A/P:  Continue pitocin per protocol, currently at 26  CEFM, currently Category I  Epidural infusing    Alejandra Lal MD, MPH  Obstetrics & Gynecology, PGY-1   "

## 2025-02-10 NOTE — L&D DELIVERY NOTE
Birth Operative Report    Patient Name: Karely Sharp  : 1990  MRN: [id number]  Age: 34 y.o.    /Para:   Gestational Age: 37w0d    Date of Surgery: 2/10/2025    Operating Room Location: [place] [address]    Pre-op Diagnosis:  Intrauterine Pregnancy at 37w0d  Failure to progress in 1st stage, failed IOL   siPEC with severe features     Post-op Diagnosis:  Same    Procedure:   Primary Low Transverse  Section    Surgery Category at [facility] Time: Confirmed Non-scheduled, Non-urgent    Surgeon:    * Corinne A Bazella - Primary    Resident/Fellow/Other Assistant:   Surgeons and Role:     * Pascual Juan MD - Resident - Assisting     * Sofy Alexis MD - Resident - Assisting    Anesthesia:  Type: epidural     Anesthesiologist: Elli Flowers MD; Angelo Walsh MD; Scott Fritz MD; Josselin Clark MD  CRNA: DOV Bañuelos-CRNA  Anesthesia Resident: Kendall Osborn MD  SRNA: João Eaton    Surgical Staff:  Circulator: Sonia Gunter RN  Scrub Person: Suzette Mayorga     Preoperative Antibiotics: Ancef 3 g    Indication for Procedure:   34 y.o.  at 37w0d  who presented for IOL in the setting of siPEC w/ SF now with >18hr s/p AROM and pitocin without change in cervical exam.  delivery in the s/o failure to progress in 1st stage, failed induction of labor.     Informed Consent:  The risks, benefits, complications, and alternatives were discussed with the patient. The patient understood that the risks of  section include but are not limited to infection, bleeding, injury to nearby structures or organs, possible need for transfusion, and potential need for more surgery. The patient stated understanding and desired to proceed. All questions were answered. The site of surgery was properly noted and marked. The patient's identity was confirmed, and the procedure verified as a  delivery. A Time Out was held and the above information  confirmed.     Findings:   Normal appearing gravid uterus, fallopian tubes, and ovaries. Amniotic fluid Clear, Male infant in Vertex     presentation, APGARS 1 , 4 .  Birth Weight 2.66 kg.    Description of Procedure:   Patient was taken to the OR where regional anesthesia was found to be adequate.  She was then placed in the dorsal supine position with a left lateral tilt. A grullon catheter was placed, SCDs were applied, and a vaginal prep was performed. A pre-procedure time out was performed. The patient was given a prophylactic dose of IV antibiotics.  She was then prepped and draped in the usual sterile fashion.     A Pfannenstiel skin incision was made with the scalpel through the skin and subcutaneous fat to the underlying fascial layer. The fascia was incised in the midline with the scalpel and the incision was bluntly extended bilaterally. The muscles were divided in the midline, the peritoneum was then identified and entered bluntly and incision extended superiorly and inferiorly taking care to avoid underlying viscera.  The bladder blade was inserted.       Uterine Incision was made with the scalpel, the uterine cavity was entered, and the hysterotomy was extended cephalocaudally by stretching. The infant was delivered atraumatically, the cord was clamped and cut and infant was handed off to awaiting nursing.  A cord segment and blood sample were collected. The placenta was then expressed. The uterus was then exteriorized and cleared of all clot and debris. The uterine incision was repaired using a running stitch of 0-Vicryl.  A second imbiricating layer was placed in a vertical fashion starting on the right side of the hysterotomy to the medial aspect of the right side of the hysterometry. Adequate hemostasis was noted. A 2nd pitocin bolus and Hemabate were given for atony. The uterus was still noted to be boggy so a B-Tejeda was performed.     The hysterotomy was again evaluated and found to be hemostatic,  the underside of the fascia and bladder and the rectus muscles were also found to be hemostatic. The fascia was closed using a running stitch of 0-PDS on a loop.  The subcutaneous layer was irrigated, small bleeders were cauterized. The subcutaneous layer was re-approximated using three interrupted sutures of 2-0 Monocryl. The skin was closed with 4-0 Monocryl. A pressure dressing was placed. All counts were correct.        * Corinne A Bazella - Primary was present for key portions of the procedure.    Complications:           Anesthesia Record               Intraprocedure I/O Totals          Intake    Magnesium Sulfate 1734.40 mL    The total shown is the total volume documented since Anesthesia Start was filed.    LR 1100.00 mL    Oxytocin Drip 755.75 mL    The total shown is the total volume documented since Anesthesia Start was filed.    fentaNYL-ROPivacaine-NaCl (PF) 2-0.2 mcg/mL-% epidural infusion 235.08 mL    azithromycin (Zithromax) 500 mg/250 mL 255.00 mL    Total Intake 4080.23 mL       Output    Urine 140 mL    Total Output 140 mL       Net    Net Volume 3940.23 mL            Blood products:    Blood Product Administration History       None            Uterotonics/Hemostatic Agent: IV Pitocin 30 units, IM Carboprost 0.25 mg, and an additional IV pitocin 30 u    Specimen:   Placenta  Delivered: 2/10/2025  4:17 PM  Appearance: Intact  Removal: Spontaneous    Disposition: pathology    Sponge/Instrument/Needle Counts: The sponge, lap and needle counts were correct.    Patient Disposition: Patient recovering on labor and delivery in stable condition.    Additional Procedures:  None    Task Performed by: RN or PA Surgical Assistant: N/A      Jing Sharp [[id number]]      Labor Events    Rupture date/time: 2/9/2025 2104  Rupture type: Artificial  Fluid color: Clear  Fluid odor: None  Labor type: Induced Onset of Labor  Labor allowed to proceed with plans for an attempted vaginal birth?: Yes  Induction:  Oxytocin, [place]/EASI  First cervical ripening date/time: 2025  Induction date/time: 2025  Induction indications: Hypertensive Disorder of Pregnancy  Complications: Failure to Progress in First Stage       Labor Event Times    Labor onset date/time: 2025       Labor Length    3rd stage: 0h 02m       Placenta    Placenta delivery date/time: 2/10/2025 1617  Placenta removal: Spontaneous  Placenta appearance: Intact  Placenta disposition: pathology       Cord    Vessels: 3 vessels  Complications: None  Delayed cord clamping?: Yes  Cord clamped date/time: 2/10/2025 16:15:30  Cord blood disposition: Lab  Gases sent?: Yes  Stem cell collection (by provider): No       Lacerations    Episiotomy: None  Perineal laceration: None  Other lacerations?: No  Repair suture: None       Anesthesia    Method: Epidural       Operative Delivery    Forceps attempted?: No  Vacuum extractor attempted?: No       Shoulder Dystocia    Shoulder dystocia present?: No       [place] Delivery    Birth date/time: 2/10/2025 16:15:00  Delivery type: , Low Transverse   categorization: primary   priority: non-scheduled, non-urgent  Indications for : Failure to Progress, Unsuccessful Induction of Labor  Complications: Failure to Progress in First Stage       Resuscitation    Method: Continuous positive airway pressure (CPAP), Suctioning, Positive pressure ventilation (PPV), Supplemental oxygen, Tactile stimulation       Apgars    Living status: Living  Apgar Component Scores:  1 min.:  5 min.:  10 min.:  15 min.:  20 min.:    Skin color:  0  0  1      Heart rate:  1  2  2      Reflex irritability:  0  1  2      Muscle tone:  0  0  1      Respiratory effort:  0  1  2      Total:  1  4  8      Apgars assigned by: LAURA WILLS       Delivery Providers    Delivering clinician: Corinne A Bazella, MD   Provider Role    Sonia Gunter, RN Delivery Nurse    Peyton Angulo, LUBA Nursery Nurse     Pascual Juan MD Resident    Sofy Alexis MD Resident

## 2025-02-10 NOTE — SIGNIFICANT EVENT
Labor check    S: Patient resting comfortably. Feeling very tired. Feeling mild contractions. Patient reports feeling sore in vaginal area. Patient notes she would like to be done with her labor. Denies HA, chest pain, SOB, change in vision, RUQ pain.       SVE: 5-6/70/-3  FHT: 120/mod/-accel/-decel  [place]: q2min    A/P: Karely is a 34 y.o.  at 37w0d undergoing IOL i    IOL  - latent labor, exam performed by Dr. Lr. Unclear at this time if patient is transitioning. Fetal and maternal status are stable with cat 1 FHT and normal vital signs. Discussed recommendation to continue with IOL at this time as may be making change and has now been on pitocin and s/p AROM for approx 14 hours. Would recommend continuing for at least 18 hours and reassesing if patient has made cervical change. Will do repeat exam between 7609-4812.   - Pitocin currently at 30  - GBS pos, PCN ppx   - epidural infusing, bag has run out. Anesthesia called by nurse to replace bag and help with pain control.     Fetal Wellbeing  - CEFM, Cat I currently     Seen and discussed with Dr. Lr.    Sofy Alexis MD  PGY1, Obstetrics and Gynecology

## 2025-02-10 NOTE — PROGRESS NOTES
Intrapartum Progress Note    Assessment/Plan   Karely Sharp is a 34 y.o.  at 36w6d, PRADEEP: 3/3/2025, by Ultrasound admitted for IOL in the setting of siPEC with severe features    IOL  Method: pitocin  Pain management: epidural   CEFM, currently Category I  GBS: positive on PCN  Next exam: as clinically indicated for maternal or fetal changes     siPEC w/SF   - Known cHTN with recent med up-titration on  to nifed 30 BID; now diagnosed with siPEC w SF by severe range BP >4hrs apart  - HELLP labs wnl, P:C 0.16  - HA, now resolved  - Continue nifed 30 BID  - Mg 2g/hr, no si/sx toxicity    Pregnancy Notables  - BMI 46  - No hx asthma or abdominal surgeries    Seen and discussed with Dr. Lilly Lal MD MPH  OBGYN    Subjective   Resting comfortably without painful contractions, no concerns currently. Denies headache, chest pain, SOB, RUQ pain, vision changes.     Objective   Last Vitals:  Temp Pulse Resp BP MAP Pulse Ox   36.6 °C (97.9 °F) 92 18 129/75   100 %     Blood Pressures         2025  1434 2025  1541 2025  1635 2025  1731 2025  1830    BP: 117/82 134/72 143/82 141/73 129/75              Vitals Min/Max Last 24 Hours:  Temp  Min: 36 °C (96.8 °F)  Max: 36.9 °C (98.4 °F)  Pulse  Min: 75  Max: 112  Resp  Min: 16  Max: 18  BP  Min: 117/82  Max: 161/99    Intake/Output:    Intake/Output Summary (Last 24 hours) at 2025 1931  Last data filed at 2025 1824  Gross per 24 hour   Intake 3698.8 ml   Output 2297 ml   Net 1401.8 ml       Physical Examination:  General: no acute distress  HEENT: normocephalic, atraumatic  Heart: warm and well perfused  Lungs: breathing comfortably on room air  Abdomen: gravid  Extremities: moving all extremities  Neuro: awake and conversant  Psych: appropriate mood and affect    SVE: 4/70/-2  FHT: 130/mod/+accel/-decel  [place]: q3min    Lab Review:  Labs in chart have been reviewed    Lab Results   Component Value Date    WBC 16.2 (H)  02/08/2025    HGB 12.1 02/08/2025    HCT 38.7 02/08/2025     02/08/2025    ALT 17 02/08/2025    AST 17 02/08/2025     02/08/2025    K 4.0 02/08/2025     02/08/2025    CREATININE 0.75 02/08/2025    BUN 11 02/08/2025    CO2 21 02/08/2025    HGBA1C 5.6 09/19/2024

## 2025-02-11 LAB
ERYTHROCYTE [DISTWIDTH] IN BLOOD BY AUTOMATED COUNT: 15.7 % (ref 11.5–14.5)
HCT VFR BLD AUTO: 35.9 % (ref 36–46)
HGB BLD-MCNC: 11.7 G/DL (ref 12–16)
MCH RBC QN AUTO: 25.4 PG (ref 26–34)
MCHC RBC AUTO-ENTMCNC: 32.6 G/DL (ref 32–36)
MCV RBC AUTO: 78 FL (ref 80–100)
NRBC BLD-RTO: 0 /100 WBCS (ref 0–0)
PLATELET # BLD AUTO: 269 X10*3/UL (ref 150–450)
RBC # BLD AUTO: 4.61 X10*6/UL (ref 4–5.2)
WBC # BLD AUTO: 21.1 X10*3/UL (ref 4.4–11.3)

## 2025-02-11 PROCEDURE — 85027 COMPLETE CBC AUTOMATED: CPT | Performed by: STUDENT IN AN ORGANIZED HEALTH CARE EDUCATION/TRAINING PROGRAM

## 2025-02-11 PROCEDURE — 99215 OFFICE O/P EST HI 40 MIN: CPT

## 2025-02-11 PROCEDURE — 99199 UNLISTED SPECIAL SVC PX/RPRT: CPT

## 2025-02-11 PROCEDURE — 1210000001 HC SEMI-PRIVATE ROOM DAILY

## 2025-02-11 PROCEDURE — 2500000001 HC RX 250 WO HCPCS SELF ADMINISTERED DRUGS (ALT 637 FOR MEDICARE OP): Mod: SE | Performed by: STUDENT IN AN ORGANIZED HEALTH CARE EDUCATION/TRAINING PROGRAM

## 2025-02-11 PROCEDURE — 36415 COLL VENOUS BLD VENIPUNCTURE: CPT | Performed by: STUDENT IN AN ORGANIZED HEALTH CARE EDUCATION/TRAINING PROGRAM

## 2025-02-11 PROCEDURE — 2500000004 HC RX 250 GENERAL PHARMACY W/ HCPCS (ALT 636 FOR OP/ED): Mod: SE | Performed by: STUDENT IN AN ORGANIZED HEALTH CARE EDUCATION/TRAINING PROGRAM

## 2025-02-11 PROCEDURE — 2500000004 HC RX 250 GENERAL PHARMACY W/ HCPCS (ALT 636 FOR OP/ED): Mod: SE

## 2025-02-11 PROCEDURE — 2500000002 HC RX 250 W HCPCS SELF ADMINISTERED DRUGS (ALT 637 FOR MEDICARE OP, ALT 636 FOR OP/ED): Mod: SE

## 2025-02-11 PROCEDURE — 99232 SBSQ HOSP IP/OBS MODERATE 35: CPT

## 2025-02-11 PROCEDURE — 2500000001 HC RX 250 WO HCPCS SELF ADMINISTERED DRUGS (ALT 637 FOR MEDICARE OP): Mod: SE

## 2025-02-11 RX ORDER — NIFEDIPINE 10 MG/1
10 CAPSULE ORAL ONCE AS NEEDED
Status: DISCONTINUED | OUTPATIENT
Start: 2025-02-11 | End: 2025-02-13 | Stop reason: HOSPADM

## 2025-02-11 RX ORDER — POLYETHYLENE GLYCOL 3350 17 G/17G
17 POWDER, FOR SOLUTION ORAL 2 TIMES DAILY PRN
Status: DISCONTINUED | OUTPATIENT
Start: 2025-02-11 | End: 2025-02-13 | Stop reason: HOSPADM

## 2025-02-11 RX ORDER — SODIUM CHLORIDE 9 MG/ML
40 INJECTION, SOLUTION INTRAVENOUS CONTINUOUS
Status: DISCONTINUED | OUTPATIENT
Start: 2025-02-11 | End: 2025-02-11

## 2025-02-11 RX ORDER — LABETALOL HYDROCHLORIDE 5 MG/ML
20 INJECTION, SOLUTION INTRAVENOUS ONCE AS NEEDED
Status: DISCONTINUED | OUTPATIENT
Start: 2025-02-11 | End: 2025-02-13 | Stop reason: HOSPADM

## 2025-02-11 RX ORDER — LOPERAMIDE HYDROCHLORIDE 2 MG/1
4 CAPSULE ORAL EVERY 2 HOUR PRN
Status: DISCONTINUED | OUTPATIENT
Start: 2025-02-11 | End: 2025-02-13 | Stop reason: HOSPADM

## 2025-02-11 RX ORDER — OXYTOCIN/0.9 % SODIUM CHLORIDE 30/500 ML
60 PLASTIC BAG, INJECTION (ML) INTRAVENOUS ONCE AS NEEDED
Status: DISCONTINUED | OUTPATIENT
Start: 2025-02-11 | End: 2025-02-13 | Stop reason: HOSPADM

## 2025-02-11 RX ORDER — MISOPROSTOL 200 UG/1
800 TABLET ORAL ONCE AS NEEDED
Status: DISCONTINUED | OUTPATIENT
Start: 2025-02-11 | End: 2025-02-13 | Stop reason: HOSPADM

## 2025-02-11 RX ORDER — OXYCODONE HYDROCHLORIDE 10 MG/1
10 TABLET ORAL ONCE
Status: COMPLETED | OUTPATIENT
Start: 2025-02-11 | End: 2025-02-11

## 2025-02-11 RX ORDER — DIPHENHYDRAMINE HCL 25 MG
25 CAPSULE ORAL EVERY 4 HOURS PRN
Status: DISCONTINUED | OUTPATIENT
Start: 2025-02-11 | End: 2025-02-13 | Stop reason: HOSPADM

## 2025-02-11 RX ORDER — ONDANSETRON HYDROCHLORIDE 2 MG/ML
4 INJECTION, SOLUTION INTRAVENOUS EVERY 6 HOURS PRN
Status: DISCONTINUED | OUTPATIENT
Start: 2025-02-11 | End: 2025-02-13 | Stop reason: HOSPADM

## 2025-02-11 RX ORDER — HYDRALAZINE HYDROCHLORIDE 20 MG/ML
5 INJECTION INTRAMUSCULAR; INTRAVENOUS ONCE AS NEEDED
Status: DISCONTINUED | OUTPATIENT
Start: 2025-02-11 | End: 2025-02-13 | Stop reason: HOSPADM

## 2025-02-11 RX ORDER — NIFEDIPINE 30 MG/1
30 TABLET, FILM COATED, EXTENDED RELEASE ORAL EVERY 24 HOURS
Status: DISCONTINUED | OUTPATIENT
Start: 2025-02-11 | End: 2025-02-13

## 2025-02-11 RX ORDER — ONDANSETRON 4 MG/1
4 TABLET, FILM COATED ORAL EVERY 6 HOURS PRN
Status: DISCONTINUED | OUTPATIENT
Start: 2025-02-11 | End: 2025-02-13 | Stop reason: HOSPADM

## 2025-02-11 RX ORDER — LIDOCAINE 560 MG/1
1 PATCH PERCUTANEOUS; TOPICAL; TRANSDERMAL
Status: DISCONTINUED | OUTPATIENT
Start: 2025-02-11 | End: 2025-02-13 | Stop reason: HOSPADM

## 2025-02-11 RX ORDER — CARBOPROST TROMETHAMINE 250 UG/ML
250 INJECTION, SOLUTION INTRAMUSCULAR ONCE AS NEEDED
Status: DISCONTINUED | OUTPATIENT
Start: 2025-02-11 | End: 2025-02-13 | Stop reason: HOSPADM

## 2025-02-11 RX ORDER — SIMETHICONE 80 MG
80 TABLET,CHEWABLE ORAL 4 TIMES DAILY PRN
Status: DISCONTINUED | OUTPATIENT
Start: 2025-02-11 | End: 2025-02-13 | Stop reason: HOSPADM

## 2025-02-11 RX ORDER — TRANEXAMIC ACID 100 MG/ML
1000 INJECTION, SOLUTION INTRAVENOUS ONCE AS NEEDED
Status: DISCONTINUED | OUTPATIENT
Start: 2025-02-11 | End: 2025-02-13 | Stop reason: HOSPADM

## 2025-02-11 RX ORDER — NIFEDIPINE 60 MG/1
60 TABLET, FILM COATED, EXTENDED RELEASE ORAL EVERY 24 HOURS
Status: DISCONTINUED | OUTPATIENT
Start: 2025-02-11 | End: 2025-02-13

## 2025-02-11 RX ORDER — DIPHENHYDRAMINE HYDROCHLORIDE 50 MG/ML
25 INJECTION INTRAMUSCULAR; INTRAVENOUS EVERY 4 HOURS PRN
Status: DISCONTINUED | OUTPATIENT
Start: 2025-02-11 | End: 2025-02-13 | Stop reason: HOSPADM

## 2025-02-11 RX ORDER — ADHESIVE BANDAGE
10 BANDAGE TOPICAL
Status: DISCONTINUED | OUTPATIENT
Start: 2025-02-11 | End: 2025-02-13 | Stop reason: HOSPADM

## 2025-02-11 RX ORDER — METHYLERGONOVINE MALEATE 0.2 MG/ML
0.2 INJECTION INTRAVENOUS ONCE AS NEEDED
Status: DISCONTINUED | OUTPATIENT
Start: 2025-02-11 | End: 2025-02-13 | Stop reason: HOSPADM

## 2025-02-11 RX ORDER — OXYTOCIN 10 [USP'U]/ML
10 INJECTION, SOLUTION INTRAMUSCULAR; INTRAVENOUS ONCE AS NEEDED
Status: DISCONTINUED | OUTPATIENT
Start: 2025-02-11 | End: 2025-02-13 | Stop reason: HOSPADM

## 2025-02-11 RX ADMIN — KETOROLAC TROMETHAMINE 30 MG: 30 INJECTION, SOLUTION INTRAMUSCULAR; INTRAVENOUS at 04:33

## 2025-02-11 RX ADMIN — IBUPROFEN 600 MG: 600 TABLET, FILM COATED ORAL at 18:11

## 2025-02-11 RX ADMIN — KETOROLAC TROMETHAMINE 30 MG: 30 INJECTION, SOLUTION INTRAMUSCULAR; INTRAVENOUS at 11:35

## 2025-02-11 RX ADMIN — HYDROMORPHONE HYDROCHLORIDE 0.2 MG: 0.2 INJECTION, SOLUTION INTRAMUSCULAR; INTRAVENOUS; SUBCUTANEOUS at 12:20

## 2025-02-11 RX ADMIN — MAGNESIUM SULFATE IN WATER 2 G/HR: 20 INJECTION, SOLUTION INTRAVENOUS at 04:33

## 2025-02-11 RX ADMIN — NIFEDIPINE 60 MG: 60 TABLET, FILM COATED, EXTENDED RELEASE ORAL at 07:29

## 2025-02-11 RX ADMIN — ACETAMINOPHEN 975 MG: 325 TABLET ORAL at 11:35

## 2025-02-11 RX ADMIN — ENOXAPARIN SODIUM 60 MG: 60 INJECTION SUBCUTANEOUS at 18:11

## 2025-02-11 RX ADMIN — ACETAMINOPHEN 975 MG: 325 TABLET ORAL at 18:11

## 2025-02-11 RX ADMIN — SODIUM CHLORIDE 40 ML/HR: 9 INJECTION, SOLUTION INTRAVENOUS at 08:04

## 2025-02-11 RX ADMIN — NIFEDIPINE 30 MG: 30 TABLET, FILM COATED, EXTENDED RELEASE ORAL at 18:11

## 2025-02-11 RX ADMIN — ACETAMINOPHEN 975 MG: 325 TABLET ORAL at 04:33

## 2025-02-11 RX ADMIN — MAGNESIUM SULFATE IN WATER 2 G/HR: 20 INJECTION, SOLUTION INTRAVENOUS at 14:16

## 2025-02-11 RX ADMIN — OXYCODONE HYDROCHLORIDE 10 MG: 10 TABLET ORAL at 15:12

## 2025-02-11 ASSESSMENT — PAIN SCALES - GENERAL
PAINLEVEL_OUTOF10: 0 - NO PAIN
PAINLEVEL_OUTOF10: 3
PAINLEVEL_OUTOF10: 0 - NO PAIN
PAINLEVEL_OUTOF10: 3
PAINLEVEL_OUTOF10: 0 - NO PAIN
PAINLEVEL_OUTOF10: 3
PAINLEVEL_OUTOF10: 3
PAINLEVEL_OUTOF10: 0 - NO PAIN
PAINLEVEL_OUTOF10: 6
PAINLEVEL_OUTOF10: 0 - NO PAIN
PAINLEVEL_OUTOF10: 7
PAINLEVEL_OUTOF10: 0 - NO PAIN

## 2025-02-11 ASSESSMENT — PAIN DESCRIPTION - DESCRIPTORS
DESCRIPTORS: ACHING
DESCRIPTORS: ACHING;SHARP
DESCRIPTORS: ACHING;SHARP

## 2025-02-11 NOTE — SIGNIFICANT EVENT
Transfer Note    Patient recovering well. Abdomen sore in epigastric area. Given oxycodone for additional pain control. Most recent /74. Denies HA, chest pain, SOB, change in vision, RUQ pain.  Completed 24hr of postpartum Mg. Safe for transfer to postpartum floor. Postpartum team to follow up ambulation and void after grullon removal.     Discussed with Dr. Drake Alexis MD  PGY1, Obstetrics and Gynecology

## 2025-02-11 NOTE — PROGRESS NOTES
Postpartum Progress Note    Assessment/Plan   33yo  s/p pCS on 2/10 for failed IOL for siPEC w/ SF    Postpartum  - pCS 2/10, intraoperative atony noted, 2nd pit bolus, hemabate, and B-martinez placed  -   - AM CBC pending, will follow up hgb   - Routine postoperative care    siPEC w/SF   - Known cHTN with recent med up-titration on  to nifed 30 BID; diagnosed with siPEC w SF by severe range BP >4hrs apart  - HELLP labs wnl, P:C 0.16  - asymp  - currently normotensive to mild range  - Continue nifed 60mg AM and 30mg at bedtime   - Mg for 24 hrs PP, no si/sx toxicity     D/w  and to be seen by Dr. Azar Alexis MD  PGY1, Obstetrics and Gynecology      Subjective   Patient resting comfortably in bed. Didn't sleep well because worried about baby. Baby is doing well in NICU. Pain well controlled. Tolerating PO. Denies HA, scotoma, RUQ pain, SOB, chest pain. Passing gas. Not yet ambulating, grullon in place.       Objective   Allergies:   Patient has no known allergies.         Last Vitals:  Temp Pulse Resp BP MAP Pulse Ox   36.6 °C (97.9 °F) 67 14 138/81 105 98 %     Vitals Min/Max Last 24 Hours:  Temp  Min: 36.2 °C (97.2 °F)  Max: 36.9 °C (98.4 °F)  Pulse  Min: 65  Max: 113  Resp  Min: 14  Max: 18  BP  Min: 115/59  Max: 173/85  MAP (mmHg)  Min: 73  Max: 120    Intake/Output:     Intake/Output Summary (Last 24 hours) at 2025 0740  Last data filed at 2025 0719  Gross per 24 hour   Intake 3642.12 ml   Output 7690 ml   Net -4047.88 ml       Physical Exam:  Constitutional: No visible distress, alert and cooperative  Respiratory/Thorax: Normal respiratory effort on RA. Lungs CTAB.  Cardiovascular: RRR  Abdomen: soft, nondistended, nontender, dressing clean dry and intact   Neurological: 2+ DTR in bilat UE  Psychological: Appropriate mood and behavior    Lab Data:  Lab Results   Component Value Date    WBC 18.0 (H) 2025    HGB 13.2 2025    HCT 42.4 2025     2025      Lab Results   Component Value Date    GLUCOSE 105 (H) 02/09/2025     (L) 02/09/2025    K 4.4 02/09/2025     02/09/2025    CO2 21 02/09/2025    ANIONGAP 15 02/09/2025    BUN 9 02/09/2025    CREATININE 0.71 02/09/2025    EGFR >90 02/09/2025    CALCIUM 8.5 (L) 02/09/2025    ALBUMIN 3.7 02/09/2025    PROT 6.8 02/09/2025    ALKPHOS 240 (H) 02/09/2025    ALT 27 02/09/2025    AST 30 02/09/2025    BILITOT 0.4 02/09/2025

## 2025-02-11 NOTE — ANESTHESIA POSTPROCEDURE EVALUATION
Patient: Karely Sharp    Procedure Summary       Date: 25 Room / Location: [facility] OB 03 / Virtual MAC 2 OB    Anesthesia Start: 422 Anesthesia Stop: 02/10/25 174    Procedure:  DELIVERY Diagnosis: (Failure to Progress)    Surgeons: Corinne A Bazella, MD Responsible Provider: Elli Flowers MD    Anesthesia Type: epidural ASA Status: 3            Anesthesia Type: epidural  Karely Sharp is a 34 y.o., , who had a , Low Transverse delivery on 2/10/2025 at 37w0d and is now POD1.    She had Neuraxial Anesthesia without immediate complications noted.       Pain well controlled    Vitals:    25 0902   BP:    Pulse: 77   Resp:    Temp:    SpO2: 99%       Neuraxial site assessed. No visible redness or swelling or drainage. Patient able to ambulate and move all extremities without difficulty. Able to void. No complaints of nausea/vomiting. Tolerating PO intake well. No s/sx of PDPH.     Anesthesia will sign off     EMILY Olivera       Anesthesia Post Evaluation    Patient location during evaluation: floor  Patient participation: complete - patient participated  Level of consciousness: awake and alert  Pain management: satisfactory to patient  Airway patency: patent  Cardiovascular status: acceptable, hemodynamically stable and stable  Respiratory status: acceptable and room air  Hydration status: stable  Postoperative Nausea and Vomiting: severe        There were no known notable events for this encounter.

## 2025-02-11 NOTE — CARE PLAN
The patient's goals for the shift include to have a safe delivery.     The clinical goals for the shift include FHR to remain reassuring throughout IOL. FHR did remain reassuring, but pt did not show signs of adequate labor progress and had a c section delivery at 1615.

## 2025-02-12 PROCEDURE — 2500000001 HC RX 250 WO HCPCS SELF ADMINISTERED DRUGS (ALT 637 FOR MEDICARE OP): Mod: SE

## 2025-02-12 PROCEDURE — 2500000004 HC RX 250 GENERAL PHARMACY W/ HCPCS (ALT 636 FOR OP/ED): Mod: SE

## 2025-02-12 PROCEDURE — 2500000002 HC RX 250 W HCPCS SELF ADMINISTERED DRUGS (ALT 637 FOR MEDICARE OP, ALT 636 FOR OP/ED): Mod: SE

## 2025-02-12 PROCEDURE — 1210000001 HC SEMI-PRIVATE ROOM DAILY

## 2025-02-12 RX ORDER — NIFEDIPINE 30 MG/1
30 TABLET, FILM COATED, EXTENDED RELEASE ORAL NIGHTLY
Qty: 30 TABLET | Refills: 1 | Status: CANCELLED | OUTPATIENT
Start: 2025-02-12 | End: 2025-04-13

## 2025-02-12 RX ORDER — NIFEDIPINE 60 MG/1
60 TABLET, FILM COATED, EXTENDED RELEASE ORAL
Qty: 30 TABLET | Refills: 1 | Status: CANCELLED | OUTPATIENT
Start: 2025-02-12 | End: 2025-04-13

## 2025-02-12 RX ADMIN — NIFEDIPINE 60 MG: 60 TABLET, FILM COATED, EXTENDED RELEASE ORAL at 06:10

## 2025-02-12 RX ADMIN — IBUPROFEN 600 MG: 600 TABLET, FILM COATED ORAL at 00:19

## 2025-02-12 RX ADMIN — ACETAMINOPHEN 975 MG: 325 TABLET ORAL at 23:18

## 2025-02-12 RX ADMIN — SIMETHICONE 80 MG: 80 TABLET, CHEWABLE ORAL at 12:21

## 2025-02-12 RX ADMIN — IBUPROFEN 600 MG: 600 TABLET, FILM COATED ORAL at 06:09

## 2025-02-12 RX ADMIN — IBUPROFEN 600 MG: 600 TABLET, FILM COATED ORAL at 12:15

## 2025-02-12 RX ADMIN — SIMETHICONE 80 MG: 80 TABLET, CHEWABLE ORAL at 20:00

## 2025-02-12 RX ADMIN — OXYCODONE 5 MG: 5 TABLET ORAL at 00:26

## 2025-02-12 RX ADMIN — OXYCODONE 10 MG: 5 TABLET ORAL at 12:21

## 2025-02-12 RX ADMIN — OXYCODONE 5 MG: 5 TABLET ORAL at 19:57

## 2025-02-12 RX ADMIN — SIMETHICONE 80 MG: 80 TABLET, CHEWABLE ORAL at 01:09

## 2025-02-12 RX ADMIN — NIFEDIPINE 30 MG: 30 TABLET, FILM COATED, EXTENDED RELEASE ORAL at 17:03

## 2025-02-12 RX ADMIN — POLYETHYLENE GLYCOL 3350 17 G: 17 POWDER, FOR SOLUTION ORAL at 20:08

## 2025-02-12 RX ADMIN — ACETAMINOPHEN 975 MG: 325 TABLET ORAL at 12:15

## 2025-02-12 RX ADMIN — ACETAMINOPHEN 975 MG: 325 TABLET ORAL at 17:03

## 2025-02-12 RX ADMIN — SIMETHICONE 80 MG: 80 TABLET, CHEWABLE ORAL at 06:09

## 2025-02-12 RX ADMIN — ENOXAPARIN SODIUM 60 MG: 60 INJECTION SUBCUTANEOUS at 17:02

## 2025-02-12 RX ADMIN — ACETAMINOPHEN 975 MG: 325 TABLET ORAL at 06:09

## 2025-02-12 RX ADMIN — IBUPROFEN 600 MG: 600 TABLET, FILM COATED ORAL at 17:03

## 2025-02-12 RX ADMIN — IBUPROFEN 600 MG: 600 TABLET, FILM COATED ORAL at 23:18

## 2025-02-12 RX ADMIN — ACETAMINOPHEN 975 MG: 325 TABLET ORAL at 00:19

## 2025-02-12 RX ADMIN — OXYCODONE 5 MG: 5 TABLET ORAL at 23:21

## 2025-02-12 ASSESSMENT — PAIN SCALES - GENERAL
PAINLEVEL_OUTOF10: 7
PAINLEVEL_OUTOF10: 5 - MODERATE PAIN
PAINLEVEL_OUTOF10: 9
PAINLEVEL_OUTOF10: 8
PAINLEVEL_OUTOF10: 7
PAINLEVEL_OUTOF10: 8
PAINLEVEL_OUTOF10: 8

## 2025-02-12 ASSESSMENT — PAIN DESCRIPTION - DESCRIPTORS
DESCRIPTORS: ACHING;SORE
DESCRIPTORS: SORE;CRAMPING
DESCRIPTORS: SORE;CRAMPING
DESCRIPTORS: ACHING;SORE

## 2025-02-12 ASSESSMENT — PAIN DESCRIPTION - LOCATION: LOCATION: ABDOMEN

## 2025-02-12 NOTE — CARE PLAN
Problem: Hypertensive Disorder of Pregnancy (HDP)  Goal: Minimal s/sx of HDP and BP<160/110  Outcome: Progressing     Problem: Pain - Adult  Goal: Verbalizes/displays adequate comfort level or baseline comfort level  Outcome: Progressing     Problem: Postpartum  Goal: Experiences normal postpartum course  Outcome: Progressing  Goal: Appropriate maternal -  bonding  Outcome: Progressing  Goal: Establish and maintain infant feeding pattern for adequate nutrition  Outcome: Progressing  Goal: Incisions, wounds, or drain sites healing without S/S of infection  Outcome: Progressing  Goal: No s/sx infection  Outcome: Progressing  Goal: No s/sx of hemorrhage  Outcome: Progressing  Goal: Minimal s/sx of HDP and BP<160/110  Outcome: Progressing  The clinical goals for the shift include meet pp milestones    Over the shift, the patient did make progress toward the following goals. The patient had stable VS and assessments throughout the shift. Pain was adequately controlled with interventions. The patient's c/s dressing was removed. The incision site is WDL.

## 2025-02-12 NOTE — PROGRESS NOTES
Postpartum Progress Note    Assessment/Plan   Karely Sharp is a 34 y.o., , who delivered at 37w0d gestation    Now PPD#2 s/p , Low Transverse on 2/10/2025  - Continue routine postpartum care  - Pain well controlled on po medications  - DVT risk score DVT Score (IF A SCORE IS NOT CALCULATING, MUST SELECT A BMI TO COMPLETE): 7 , ppx with SCDs, ambulation, and lovenox  - RH positive, rhogam not indicated  - Hgb:   Results from last 7 days   Lab Units 25  0753 25  2146 25  1752   HEMOGLOBIN g/dL 11.7* 13.2 12.1      siPEC w/SF   - Known cHTN with recent med up-titration on  to  BID  - diagnosed with siPEC w SF by severe range BP >4hrs apart  - HELLP labs wnl, P:C 0.16  - s/p Mg  - current regimen: nifed 60mg AM and 30mg at bedtime   - asymptomatic  - normotensive with one mild range on PPD2  - continue to monitor    Acute Blood Loss  -  ml  - Hg as above  - asymptomatic  - VSS  - continue to monitor    Maternal Well-Being  - Vitals stable  - All questions and concerns address    [place] Feeding  - Breastfeeding/pumping encouraged  - Lactation consult prn    Contraception  - considering Pops or Depo prior to discharge, still deciding  - We discussed pregnancy spacing of at least two years, abstaining from intercourse for 6wks, and the ability to become pregnant in the absence of regular menses. Pt verbalized understanding.  - Encouraged to continue PNV    Dispo  - Anticipate d/c on PPD #3 pending BP control and if meeting all post op and postpartum milestones  - Follow-up in 2-5 days for BP check  - Follow-up in 1-2wks for incision check  - Follow-up in 4-6wks with primary DOV Hebert-CNP     Assessment & Plan  Chronic hypertension affecting pregnancy (HHS-HCC)    Pregnancy Problems (from 24 to present)       Problem Noted Diagnosed Resolved    Chronic hypertension affecting pregnancy ([facility]HS-HCC) 2025 by Rex Hope MD  No     Priority:  Medium       Overview Addendum 2025 10:42 AM by Rex Hope MD     : Blood pressure high mild range in the setting of chronic hypertension.  Will start patient on oral nifedipine XL 30 mg daily.  Patient to obtain ambulatory CBC/CMP/UPC today   : labs reviewed and WNL; BP today WNL on nifed 30mg daily  : /91, current medications today.  Will increase nifedipine to 30 mg twice daily. IOL scheduled for 37 weeks.         36 weeks gestation of pregnancy ([facility]) 2024 by Lillian Montague, APRN-CNP  No    Priority:  Medium       Overview Addendum 2025  3:12 PM by Rex Hope MD     Desired provider in labor: [] CNM  [] Physician  [x] Blood Products: [x] Yes, accepts [] No, needs counseling  [x] Initial BMI: 41.28   [x] Prenatal Labs: Equivocal rubella  [x] Cervical Cancer Screening up to date: WNL  [x] Rh status: Rh+  [x] Genetic Screening: low risk XY  [x] NT US: (11-13 wks) late screen  [x] Baby ASA (if indicated): yes  [x] Pregnancy dated by: us er    [x] Anatomy US: (19-20 wks)    [] Federal Sterilization consent signed (if indicated):  [x] 1hr GCT at 24-28wks:   [x] Rhogam (if indicated): n/a  [x] Fetal Surveillance (if indicated): Weekly NSTs given chronic hypertension  [x] Tdap (27-32 wks, may be given up to 36 wks if initial window missed):   [] RSV (32-36 wks) (Sept. to end ):   [] Flu Vaccine:    [] Breastfeeding:  [] Postpartum Birth control method:   [x] GBS at 36 - 37 wks: POS  [x] 39 weeks discussion of IOL vs. Expectant management: N/A  [x] Mode of delivery ( anticipated ): IOL at 37 weeks                 Subjective     Karely Sharp is PPD#2 s/p  who reports feeling overall well.  No acute events overnight.  Voiding spontaneously, passing flatus.  Pain well controlled on PO meds.  Light lochia. Tolerating diet.  Denies HA, N/V, RUQ pain, vision changes, chest pain, or SOB. Denies dizziness/lightheadedness/LOC/uncontrolled  bleeding. Encouraged ambulation in halls and increased hydration.     Objective   Allergies:   Patient has no known allergies.         Last Vitals:  Temp Pulse Resp BP MAP Pulse Ox   36.8 °C (98.2 °F) 81 18 127/84   99 %     Vitals Min/Max Last 24 Hours:  Temp  Min: 36.3 °C (97.3 °F)  Max: 36.9 °C (98.4 °F)  Pulse  Min: 70  Max: 95  Resp  Min: 14  Max: 18  BP  Min: 122/73  Max: 141/68    Intake/Output:     Intake/Output Summary (Last 24 hours) at 2/12/2025 1352  Last data filed at 2/12/2025 0000  Gross per 24 hour   Intake 238.5 ml   Output 960 ml   Net -721.5 ml       Physical Exam:  General: examination reveals a well developed, well nourished, female, in no acute distress. She is alert and cooperative.  Lungs: breathing even and unlabored, lungs clear all fields  Cardiac: warm and well perfused, heart rate regular, no murmur  Fundus: firm and below umbilicus, lochia light  Abdominal: soft, appropriately tender, non-distended, bowel sounds active  Extremities: no redness or tenderness in the calves or thighs, edema: 1+ pitting BLE  Neurological: alert, oriented, normal speech, no focal findings or movement disorder noted.  Psychological: awake and alert; oriented to person, place, and time.  Skin: incision clean, dry, intact, well approximated, no redness, drainage, or warmth     Lab Data:  GBS positive on 1/29/2025  0   Lab Value Date/Time    GRPBSTREP SEE NOTE (A) 01/29/2025 0927     Lab Results   Component Value Date    GLUCOSE 105 (H) 02/09/2025     (L) 02/09/2025    K 4.4 02/09/2025     02/09/2025    CO2 21 02/09/2025    ANIONGAP 15 02/09/2025    BUN 9 02/09/2025    CREATININE 0.71 02/09/2025    EGFR >90 02/09/2025    CALCIUM 8.5 (L) 02/09/2025    ALBUMIN 3.7 02/09/2025    PROT 6.8 02/09/2025    ALKPHOS 240 (H) 02/09/2025    ALT 27 02/09/2025    AST 30 02/09/2025    BILITOT 0.4 02/09/2025     0   Lab Value Date/Time    UTPCR 0.16 02/08/2025 1756    UTPCR 0.11 12/03/2024 1122

## 2025-02-12 NOTE — SIGNIFICANT EVENT
Patient meets criteria for home monitoring of blood pressure post discharge.  Reason: current preeclampsia with severe features  history of chronic hypertension. Met with patient to assess for availability of home BP monitor.  Patient does not have access to BP monitor at home and declined obtaining BP monitor from [employer]. Patient declined a prescription being sent to her pharmacy of choice.  Patient  verbalized responsibility for obtaining her own BP monitor after discharge. Patient educated on importance of continuing to monitor BP at home, recording BP on home monitoring log and s/sx of when to call her provider.  Pt verbalized understanding the above information.

## 2025-02-13 VITALS
OXYGEN SATURATION: 100 % | WEIGHT: 261.47 LBS | RESPIRATION RATE: 19 BRPM | HEART RATE: 87 BPM | TEMPERATURE: 98.4 F | BODY MASS INDEX: 46.33 KG/M2 | HEIGHT: 63 IN | DIASTOLIC BLOOD PRESSURE: 77 MMHG | SYSTOLIC BLOOD PRESSURE: 119 MMHG

## 2025-02-13 LAB
LABORATORY COMMENT REPORT: NORMAL
PATH REPORT.FINAL DX SPEC: NORMAL
PATH REPORT.GROSS SPEC: NORMAL
PATH REPORT.RELEVANT HX SPEC: NORMAL
PATH REPORT.TOTAL CANCER: NORMAL

## 2025-02-13 PROCEDURE — 90707 MMR VACCINE SC: CPT | Mod: SE

## 2025-02-13 PROCEDURE — 90471 IMMUNIZATION ADMIN: CPT

## 2025-02-13 PROCEDURE — 2500000004 HC RX 250 GENERAL PHARMACY W/ HCPCS (ALT 636 FOR OP/ED): Mod: SE

## 2025-02-13 PROCEDURE — 2500000002 HC RX 250 W HCPCS SELF ADMINISTERED DRUGS (ALT 637 FOR MEDICARE OP, ALT 636 FOR OP/ED): Mod: SE

## 2025-02-13 PROCEDURE — 2500000001 HC RX 250 WO HCPCS SELF ADMINISTERED DRUGS (ALT 637 FOR MEDICARE OP): Mod: SE

## 2025-02-13 PROCEDURE — 99238 HOSP IP/OBS DSCHRG MGMT 30/<: CPT | Performed by: NURSE PRACTITIONER

## 2025-02-13 RX ORDER — ACETAMINOPHEN 325 MG/1
975 TABLET ORAL EVERY 6 HOURS
Qty: 360 TABLET | Refills: 0 | Status: SHIPPED | OUTPATIENT
Start: 2025-02-13 | End: 2025-03-15

## 2025-02-13 RX ORDER — POLYETHYLENE GLYCOL 3350 17 G/17G
17 POWDER, FOR SOLUTION ORAL DAILY
Qty: 30 PACKET | Refills: 0 | Status: SHIPPED | OUTPATIENT
Start: 2025-02-13 | End: 2025-03-15

## 2025-02-13 RX ORDER — OXYCODONE HYDROCHLORIDE 5 MG/1
5 TABLET ORAL EVERY 4 HOURS PRN
Qty: 8 TABLET | Refills: 0 | Status: SHIPPED | OUTPATIENT
Start: 2025-02-13 | End: 2025-02-16 | Stop reason: HOSPADM

## 2025-02-13 RX ORDER — NORETHINDRONE 0.35 MG/1
1 TABLET ORAL DAILY
Qty: 28 TABLET | Refills: 12 | Status: SHIPPED | OUTPATIENT
Start: 2025-02-13

## 2025-02-13 RX ORDER — NIFEDIPINE 60 MG/1
60 TABLET, FILM COATED, EXTENDED RELEASE ORAL 2 TIMES DAILY
Qty: 60 TABLET | Refills: 3 | Status: SHIPPED | OUTPATIENT
Start: 2025-02-13

## 2025-02-13 RX ORDER — SIMETHICONE 80 MG
80 TABLET,CHEWABLE ORAL EVERY 6 HOURS PRN
Qty: 30 TABLET | Refills: 0 | Status: SHIPPED | OUTPATIENT
Start: 2025-02-13

## 2025-02-13 RX ORDER — NALOXONE HYDROCHLORIDE 4 MG/.1ML
4 SPRAY NASAL AS NEEDED
Qty: 2 EACH | Refills: 11 | Status: SHIPPED | OUTPATIENT
Start: 2025-02-13

## 2025-02-13 RX ORDER — IBUPROFEN 600 MG/1
600 TABLET ORAL EVERY 6 HOURS
Qty: 120 TABLET | Refills: 0 | Status: SHIPPED | OUTPATIENT
Start: 2025-02-13 | End: 2025-03-15

## 2025-02-13 RX ORDER — NIFEDIPINE 60 MG/1
60 TABLET, FILM COATED, EXTENDED RELEASE ORAL 2 TIMES DAILY
Status: DISCONTINUED | OUTPATIENT
Start: 2025-02-13 | End: 2025-02-13 | Stop reason: HOSPADM

## 2025-02-13 RX ADMIN — IBUPROFEN 600 MG: 600 TABLET, FILM COATED ORAL at 05:33

## 2025-02-13 RX ADMIN — NIFEDIPINE 60 MG: 60 TABLET, FILM COATED, EXTENDED RELEASE ORAL at 05:33

## 2025-02-13 RX ADMIN — SIMETHICONE 80 MG: 80 TABLET, CHEWABLE ORAL at 10:31

## 2025-02-13 RX ADMIN — ACETAMINOPHEN 975 MG: 325 TABLET ORAL at 05:33

## 2025-02-13 RX ADMIN — IBUPROFEN 600 MG: 600 TABLET, FILM COATED ORAL at 12:31

## 2025-02-13 RX ADMIN — OXYCODONE 10 MG: 5 TABLET ORAL at 12:40

## 2025-02-13 RX ADMIN — OXYCODONE 10 MG: 5 TABLET ORAL at 03:39

## 2025-02-13 RX ADMIN — SIMETHICONE 80 MG: 80 TABLET, CHEWABLE ORAL at 02:58

## 2025-02-13 RX ADMIN — MEASLES, MUMPS, AND RUBELLA VIRUS VACCINE LIVE 0.5 ML: 1000; 12500; 1000 INJECTION, POWDER, LYOPHILIZED, FOR SUSPENSION SUBCUTANEOUS at 12:32

## 2025-02-13 RX ADMIN — ACETAMINOPHEN 975 MG: 325 TABLET ORAL at 12:31

## 2025-02-13 RX ADMIN — POLYETHYLENE GLYCOL 3350 17 G: 17 POWDER, FOR SOLUTION ORAL at 10:32

## 2025-02-13 ASSESSMENT — PAIN SCALES - GENERAL
PAINLEVEL_OUTOF10: 9
PAINLEVEL_OUTOF10: 4
PAINLEVEL_OUTOF10: 9
PAINLEVEL_OUTOF10: 0 - NO PAIN

## 2025-02-13 ASSESSMENT — PAIN DESCRIPTION - DESCRIPTORS
DESCRIPTORS: CRAMPING
DESCRIPTORS: CRAMPING
DESCRIPTORS: PRESSURE

## 2025-02-13 ASSESSMENT — PAIN DESCRIPTION - LOCATION: LOCATION: ABDOMEN

## 2025-02-13 ASSESSMENT — PAIN DESCRIPTION - ORIENTATION: ORIENTATION: LOWER

## 2025-02-13 NOTE — LACTATION NOTE
"Lactation Consultant Note  Lactation Consultation  Reason for Consult: Initial assessment, NICU baby, Other (Comment) (infant delivered at 37.0 weeks -in NICU for respiratory issues per parents report)  Consultant Name: Kinsey Martinez RN IBCLC    Maternal Information  Exclusive Pump and Bottle Feed: Yes (infant currently in NICU)    Maternal Assessment  Breast Assessment: Large, Pendulous, Symmetrical, Soft, Compressible  Nipple Assessment: Intact, Erect  [place] Assessment: Normal    Infant Assessment       Feeding Assessment  Nutrition Source:  (per NICU)  Feeding Method: Other (Comment) (per NICU)    LATCH TOOL       Breast Pump  Pump: Hospital grade electric pump, Double breast pumping  Frequency: 3-4 times per day (encouraged to increase frequency of pumping sessions to every 2-3 hours)  Duration: Initiate phase  Breast Shield Size and Type: 24 mm, Other (comment) (measured maternal nipple diameter to be 21mm- instructed mother to continue using the 24mm)  Units of Volume: mL per session    Other OB Lactation Tools  Lactation Tools: Flanges    Patient Follow-up       Other OB Lactation Documentation  Maternal Risk Factors: Obesity (pre-pregnancy BMI >30), Hypertension,  delivery  Infant Risk Factors: Early term birth 37-39 weeks    Recommendations/Summary  Mother reports that she has not been pumping regularly and consistently every 2-3 hours. She shared that she had pumped \"about three times\" in the last day. Pumping was limited due to the pain from delivery that she was experiencing. Encouraged mother to increase her pumping frequency. Discussed with mother the importance of frequent breast stimulation 8-12 times a day in order to adequately stimulate her breasts and to bring in a full milk supply. Mother has been expressing out small amounts of colostrum with her pumping sessions. She denied experiencing any discomfort with her pumping sessions. Reviewed with parents how to properly clean and " sterilize her pump parts. Mother has an aeroflow pump at home. Infant is being discharged home today with mother. Informed mother of the availability of the outpatient lactation centers for guidance and lactation assistance upon their discharge.  Provided mother with PI sheet #123 and the cdc handout on cleaning pump parts for further reference. Mother denied having any concerns nor questions at this time.

## 2025-02-13 NOTE — LACTATION NOTE
Lactation Consultant Note  Lactation Consultation  Ritu Baez, RN IBCLC  Recommendations/Summary       I spoke with Mom at pt's bedside.  She reports that she just spoke with LC at [facility] and has no questions or concerns about breastfeeding.  Mom was provided out pt resources for lactation as the baby is going home. Written Breastfeeding discharge information on the following topics: Electric Breast Pumps & Milk Storage for Your Healthy Baby, Breast-feeding: Tips to Increase Your Milk Supply, Is My Breast-fed Baby Getting Enough to Eat?, Nursing Your Baby,  Lactation Center Information, [facility] Breastfeeding & safe sleep information, [facility] Breastfeeding Hotline were given to mom.  Invited to contact LC services as needed.

## 2025-02-13 NOTE — DISCHARGE SUMMARY
Discharge Summary    Admission Date: 2025  Discharge Date: 25      Discharge Diagnosis  Chronic hypertension affecting pregnancy ([facility]-HCC)    Hospital Course  Delivery Date: 2/10/2025 4:15 PM  Delivery type: , Low Transverse   GA at delivery: 37w0d  Outcome: Living  Anesthesia during delivery: Epidural  Intrapartum complications: Failure to Progress in First Stage  Feeding method: Breastfeeding Status: Yes     Procedures: none  Contraception at discharge: oral contraceptives  Ambulating in room.  Feels well.  Meeting all post op milestones.  Reviewed bp's/meds/monitoring for home and when to return prn.  Home nifed 60/60.  OCPs for bcm    Pertinent Physical Exam At Time of Discharge  General: Examination reveals a well developed, well nourished, female, in no acute distress. She is alert and cooperative.  Lungs: symmetrical, non-labored breathing.  Cardiac: warm, well-perfused.  Abdomen: soft, non-tender.  Fundus: firm, at umbilicus, and nontender.  Extremities: no redness or tenderness in the calves or thighs.  Neurological: alert, oriented, normal speech, no focal findings or movement disorder noted.     Last Vitals:  Temp Pulse Resp BP MAP Pulse Ox   36.9 °C (98.4 °F) 87 19 119/77 91 100 %     Discharge Meds     Your medication list        START taking these medications        Instructions Last Dose Given Next Dose Due   acetaminophen 325 mg tablet  Commonly known as: Tylenol      Take 3 tablets (975 mg) by mouth every 6 hours.       ibuprofen 600 mg tablet      Take 1 tablet (600 mg) by mouth every 6 hours.       naloxone 4 mg/0.1 mL nasal spray  Commonly known as: Narcan      Administer 1 spray (4 mg) into affected nostril(s) if needed for opioid reversal or respiratory depression. May repeat every 2-3 minutes if needed, alternating nostrils, until medical assistance becomes available.       oxyCODONE 5 mg immediate release tablet  Commonly known as: Roxicodone      Take 1 tablet (5 mg) by  mouth every 4 hours if needed (Pain score 6-8).       polyethylene glycol 17 gram packet  Commonly known as: Glycolax, Miralax      Take 17 g by mouth once daily.              CHANGE how you take these medications        Instructions Last Dose Given Next Dose Due   NIFEdipine ER 60 mg 24 hr tablet  Commonly known as: Adalat CC  What changed:   medication strength  how much to take  when to take this      Take 1 tablet (60 mg) by mouth 2 times a day. Do not crush, chew, or split.              CONTINUE taking these medications        Instructions Last Dose Given Next Dose Due   prenatal vitamin calcium-iron-folic 27 mg iron- 1 mg tablet      Take 1 tablet by mouth once daily.              STOP taking these medications      aspirin 81 mg chewable tablet                  Where to Get Your Medications        These medications were sent to [facility]/pharmacy #[id number] [address] AT [place] [id number]      Phone: [phone]   acetaminophen 325 mg tablet  ibuprofen 600 mg tablet  naloxone 4 mg/0.1 mL nasal spray  NIFEdipine ER 60 mg 24 hr tablet  oxyCODONE 5 mg immediate release tablet  polyethylene glycol 17 gram packet          Complications Requiring Follow-Up  Heavy vaginal bleeding, passing clots, fever and/or chills      Test Results Pending At Discharge  Pending Labs       Order Current Status    Surgical Pathology Exam - PLACENTA In process            Outpatient Follow-Up  No future appointments.    I spent 10 minutes in the professional and overall care of this patient.      Madhavi Bhatt, APRN-CNP

## 2025-02-13 NOTE — CARE PLAN
Problem: Hypertensive Disorder of Pregnancy (HDP)  Goal: Minimal s/sx of HDP and BP<160/110  Outcome: Met     Problem: Pain - Adult  Goal: Verbalizes/displays adequate comfort level or baseline comfort level  Outcome: Met     Problem: Safety - Adult  Goal: Free from fall injury  Outcome: Met     Problem: Chronic Conditions and Co-morbidities  Goal: Patient's chronic conditions and co-morbidity symptoms are monitored and maintained or improved  Outcome: Met     Problem: Postpartum  Goal: Experiences normal postpartum course  Outcome: Met  Goal: Appropriate maternal -  bonding  Outcome: Met  Goal: Establish and maintain infant feeding pattern for adequate nutrition  Outcome: Met  Goal: Incisions, wounds, or drain sites healing without S/S of infection  Outcome: Met  Goal: No s/sx infection  Outcome: Met  Goal: No s/sx of hemorrhage  Outcome: Met  Goal: Minimal s/sx of HDP and BP<160/110  Outcome: Met   The patient's goals for the shift include rest    The clinical goals for the shift include Meet postpartum milestones    Over the shift, the patient did make progress toward the following goals. Discharge education reviewed with patient and patient ready for discharge

## 2025-02-15 ENCOUNTER — HOSPITAL ENCOUNTER (EMERGENCY)
Facility: HOSPITAL | Age: 35
Discharge: NURSING, REHAB OR LONG-TERM CARE | End: 2025-02-15
Payer: MEDICAID

## 2025-02-15 VITALS
HEIGHT: 63 IN | BODY MASS INDEX: 46.25 KG/M2 | OXYGEN SATURATION: 99 % | HEART RATE: 76 BPM | DIASTOLIC BLOOD PRESSURE: 101 MMHG | RESPIRATION RATE: 18 BRPM | WEIGHT: 261 LBS | TEMPERATURE: 98.1 F | SYSTOLIC BLOOD PRESSURE: 168 MMHG

## 2025-02-15 PROCEDURE — 4500999001 HC ED NO CHARGE

## 2025-02-15 ASSESSMENT — PAIN SCALES - GENERAL: PAINLEVEL_OUTOF10: 8

## 2025-02-15 ASSESSMENT — COLUMBIA-SUICIDE SEVERITY RATING SCALE - C-SSRS
1. IN THE PAST MONTH, HAVE YOU WISHED YOU WERE DEAD OR WISHED YOU COULD GO TO SLEEP AND NOT WAKE UP?: NO
2. HAVE YOU ACTUALLY HAD ANY THOUGHTS OF KILLING YOURSELF?: NO
6. HAVE YOU EVER DONE ANYTHING, STARTED TO DO ANYTHING, OR PREPARED TO DO ANYTHING TO END YOUR LIFE?: NO

## 2025-02-16 ENCOUNTER — ANESTHESIA EVENT (OUTPATIENT)
Dept: OPERATING ROOM | Facility: HOSPITAL | Age: 35
End: 2025-02-16
Payer: MEDICAID

## 2025-02-16 ENCOUNTER — HOSPITAL ENCOUNTER (OUTPATIENT)
Facility: HOSPITAL | Age: 35
Setting detail: OBSERVATION
Discharge: HOME | End: 2025-02-16
Attending: EMERGENCY MEDICINE | Admitting: INTERNAL MEDICINE
Payer: MEDICAID

## 2025-02-16 ENCOUNTER — ANESTHESIA (OUTPATIENT)
Dept: OPERATING ROOM | Facility: HOSPITAL | Age: 35
End: 2025-02-16
Payer: MEDICAID

## 2025-02-16 ENCOUNTER — APPOINTMENT (OUTPATIENT)
Dept: RADIOLOGY | Facility: HOSPITAL | Age: 35
End: 2025-02-16
Payer: MEDICAID

## 2025-02-16 ENCOUNTER — HOSPITAL ENCOUNTER (OUTPATIENT)
Facility: HOSPITAL | Age: 35
Discharge: HOME | End: 2025-02-16
Attending: OBSTETRICS & GYNECOLOGY | Admitting: OBSTETRICS & GYNECOLOGY
Payer: MEDICAID

## 2025-02-16 VITALS
DIASTOLIC BLOOD PRESSURE: 77 MMHG | HEART RATE: 79 BPM | OXYGEN SATURATION: 98 % | RESPIRATION RATE: 18 BRPM | SYSTOLIC BLOOD PRESSURE: 148 MMHG | TEMPERATURE: 98.4 F

## 2025-02-16 VITALS
SYSTOLIC BLOOD PRESSURE: 149 MMHG | HEART RATE: 70 BPM | TEMPERATURE: 97.7 F | OXYGEN SATURATION: 99 % | DIASTOLIC BLOOD PRESSURE: 93 MMHG | RESPIRATION RATE: 16 BRPM

## 2025-02-16 DIAGNOSIS — K59.03 CONSTIPATION DUE TO PAIN MEDICATION: ICD-10-CM

## 2025-02-16 DIAGNOSIS — K80.00 CALCULUS OF GALLBLADDER WITH ACUTE CHOLECYSTITIS WITHOUT OBSTRUCTION: Primary | ICD-10-CM

## 2025-02-16 DIAGNOSIS — K81.0 ACUTE CHOLECYSTITIS: ICD-10-CM

## 2025-02-16 LAB
ALBUMIN SERPL BCP-MCNC: 4 G/DL (ref 3.4–5)
ALP SERPL-CCNC: 162 U/L (ref 33–110)
ALT SERPL W P-5'-P-CCNC: 110 U/L (ref 7–45)
ANION GAP SERPL CALC-SCNC: 16 MMOL/L (ref 10–20)
APTT PPP: 29 SECONDS (ref 27–38)
AST SERPL W P-5'-P-CCNC: 99 U/L (ref 9–39)
BASOPHILS # BLD AUTO: 0.04 X10*3/UL (ref 0–0.1)
BASOPHILS NFR BLD AUTO: 0.2 %
BILIRUB SERPL-MCNC: 0.5 MG/DL (ref 0–1.2)
BILIRUBIN, POC: NORMAL
BLOOD URINE, POC: POSITIVE
BUN SERPL-MCNC: 20 MG/DL (ref 6–23)
CALCIUM SERPL-MCNC: 9.4 MG/DL (ref 8.6–10.3)
CHLORIDE SERPL-SCNC: 102 MMOL/L (ref 98–107)
CLARITY, POC: CLEAR
CO2 SERPL-SCNC: 24 MMOL/L (ref 21–32)
COLOR, POC: NORMAL
CREAT SERPL-MCNC: 0.77 MG/DL (ref 0.5–1.05)
CRP SERPL-MCNC: 3.99 MG/DL
EGFRCR SERPLBLD CKD-EPI 2021: >90 ML/MIN/1.73M*2
EOSINOPHIL # BLD AUTO: 0.01 X10*3/UL (ref 0–0.7)
EOSINOPHIL NFR BLD AUTO: 0.1 %
ERYTHROCYTE [DISTWIDTH] IN BLOOD BY AUTOMATED COUNT: 16.6 % (ref 11.5–14.5)
FLUAV RNA RESP QL NAA+PROBE: NOT DETECTED
FLUBV RNA RESP QL NAA+PROBE: NOT DETECTED
GLUCOSE SERPL-MCNC: 81 MG/DL (ref 74–99)
GLUCOSE URINE, POC: NEGATIVE
HCT VFR BLD AUTO: 39.9 % (ref 36–46)
HGB BLD-MCNC: 13 G/DL (ref 12–16)
IMM GRANULOCYTES # BLD AUTO: 0.07 X10*3/UL (ref 0–0.7)
IMM GRANULOCYTES NFR BLD AUTO: 0.4 % (ref 0–0.9)
INR PPP: 1.2 (ref 0.9–1.1)
KETONES, POC: POSITIVE
LDH SERPL L TO P-CCNC: 536 U/L (ref 84–246)
LEUKOCYTE EST, POC: NEGATIVE
LIPASE SERPL-CCNC: 10 U/L (ref 9–82)
LYMPHOCYTES # BLD AUTO: 2.13 X10*3/UL (ref 1.2–4.8)
LYMPHOCYTES NFR BLD AUTO: 12.5 %
MCH RBC QN AUTO: 25.5 PG (ref 26–34)
MCHC RBC AUTO-ENTMCNC: 32.6 G/DL (ref 32–36)
MCV RBC AUTO: 78 FL (ref 80–100)
MONOCYTES # BLD AUTO: 0.54 X10*3/UL (ref 0.1–1)
MONOCYTES NFR BLD AUTO: 3.2 %
NEUTROPHILS # BLD AUTO: 14.3 X10*3/UL (ref 1.2–7.7)
NEUTROPHILS NFR BLD AUTO: 83.6 %
NITRITE, POC: NEGATIVE
NRBC BLD-RTO: 0 /100 WBCS (ref 0–0)
PH, POC: 6
PLATELET # BLD AUTO: 557 X10*3/UL (ref 150–450)
POC APPEARANCE OF BODY FLUID: CLEAR
POTASSIUM SERPL-SCNC: 5.4 MMOL/L (ref 3.5–5.3)
PROT SERPL-MCNC: 8.1 G/DL (ref 6.4–8.2)
PROTHROMBIN TIME: 13.2 SECONDS (ref 9.8–12.8)
RBC # BLD AUTO: 5.1 X10*6/UL (ref 4–5.2)
SARS-COV-2 RNA RESP QL NAA+PROBE: NOT DETECTED
SODIUM SERPL-SCNC: 137 MMOL/L (ref 136–145)
SPECIFIC GRAVITY, POC: 1.03
URATE SERPL-MCNC: 9.5 MG/DL (ref 2.3–6.7)
URINE PROTEIN, POC: NORMAL
UROBILINOGEN, POC: 0.2
WBC # BLD AUTO: 17.1 X10*3/UL (ref 4.4–11.3)

## 2025-02-16 PROCEDURE — 2500000005 HC RX 250 GENERAL PHARMACY W/O HCPCS: Performed by: ANESTHESIOLOGY

## 2025-02-16 PROCEDURE — 85610 PROTHROMBIN TIME: CPT | Performed by: EMERGENCY MEDICINE

## 2025-02-16 PROCEDURE — 2500000001 HC RX 250 WO HCPCS SELF ADMINISTERED DRUGS (ALT 637 FOR MEDICARE OP): Performed by: OBSTETRICS & GYNECOLOGY

## 2025-02-16 PROCEDURE — 2500000004 HC RX 250 GENERAL PHARMACY W/ HCPCS (ALT 636 FOR OP/ED): Performed by: EMERGENCY MEDICINE

## 2025-02-16 PROCEDURE — 99221 1ST HOSP IP/OBS SF/LOW 40: CPT | Performed by: SURGERY

## 2025-02-16 PROCEDURE — 2550000001 HC RX 255 CONTRASTS: Performed by: SURGERY

## 2025-02-16 PROCEDURE — 86140 C-REACTIVE PROTEIN: CPT | Performed by: INTERNAL MEDICINE

## 2025-02-16 PROCEDURE — 7100000001 HC RECOVERY ROOM TIME - INITIAL BASE CHARGE: Performed by: SURGERY

## 2025-02-16 PROCEDURE — G0378 HOSPITAL OBSERVATION PER HR: HCPCS

## 2025-02-16 PROCEDURE — 3700000001 HC GENERAL ANESTHESIA TIME - INITIAL BASE CHARGE: Performed by: SURGERY

## 2025-02-16 PROCEDURE — A47563 PR LAP,CHOLECYSTECTOMY/GRAPH: Performed by: ANESTHESIOLOGY

## 2025-02-16 PROCEDURE — 99140 ANES COMP EMERGENCY COND: CPT | Performed by: ANESTHESIOLOGY

## 2025-02-16 PROCEDURE — 2500000004 HC RX 250 GENERAL PHARMACY W/ HCPCS (ALT 636 FOR OP/ED): Performed by: ADVANCED PRACTICE MIDWIFE

## 2025-02-16 PROCEDURE — 99285 EMERGENCY DEPT VISIT HI MDM: CPT | Mod: 25 | Performed by: EMERGENCY MEDICINE

## 2025-02-16 PROCEDURE — 2500000005 HC RX 250 GENERAL PHARMACY W/O HCPCS: Performed by: SURGERY

## 2025-02-16 PROCEDURE — 2500000001 HC RX 250 WO HCPCS SELF ADMINISTERED DRUGS (ALT 637 FOR MEDICARE OP): Performed by: ANESTHESIOLOGY

## 2025-02-16 PROCEDURE — 3600000004 HC OR TIME - INITIAL BASE CHARGE - PROCEDURE LEVEL FOUR: Performed by: SURGERY

## 2025-02-16 PROCEDURE — 2500000004 HC RX 250 GENERAL PHARMACY W/ HCPCS (ALT 636 FOR OP/ED): Performed by: HOSPITALIST

## 2025-02-16 PROCEDURE — 76705 ECHO EXAM OF ABDOMEN: CPT

## 2025-02-16 PROCEDURE — 2780000003 HC OR 278 NO HCPCS: Performed by: SURGERY

## 2025-02-16 PROCEDURE — 36415 COLL VENOUS BLD VENIPUNCTURE: CPT | Performed by: EMERGENCY MEDICINE

## 2025-02-16 PROCEDURE — 83690 ASSAY OF LIPASE: CPT | Performed by: EMERGENCY MEDICINE

## 2025-02-16 PROCEDURE — 2720000007 HC OR 272 NO HCPCS: Performed by: SURGERY

## 2025-02-16 PROCEDURE — 84550 ASSAY OF BLOOD/URIC ACID: CPT | Performed by: ADVANCED PRACTICE MIDWIFE

## 2025-02-16 PROCEDURE — 36415 COLL VENOUS BLD VENIPUNCTURE: CPT | Performed by: ADVANCED PRACTICE MIDWIFE

## 2025-02-16 PROCEDURE — 74300 X-RAY BILE DUCTS/PANCREAS: CPT

## 2025-02-16 PROCEDURE — 2500000004 HC RX 250 GENERAL PHARMACY W/ HCPCS (ALT 636 FOR OP/ED): Performed by: INTERNAL MEDICINE

## 2025-02-16 PROCEDURE — 85730 THROMBOPLASTIN TIME PARTIAL: CPT | Performed by: EMERGENCY MEDICINE

## 2025-02-16 PROCEDURE — 74300 X-RAY BILE DUCTS/PANCREAS: CPT | Performed by: SURGERY

## 2025-02-16 PROCEDURE — 2500000004 HC RX 250 GENERAL PHARMACY W/ HCPCS (ALT 636 FOR OP/ED): Performed by: SURGERY

## 2025-02-16 PROCEDURE — 2500000004 HC RX 250 GENERAL PHARMACY W/ HCPCS (ALT 636 FOR OP/ED): Performed by: ANESTHESIOLOGIST ASSISTANT

## 2025-02-16 PROCEDURE — 99222 1ST HOSP IP/OBS MODERATE 55: CPT

## 2025-02-16 PROCEDURE — 3700000002 HC GENERAL ANESTHESIA TIME - EACH INCREMENTAL 1 MINUTE: Performed by: SURGERY

## 2025-02-16 PROCEDURE — A47563 PR LAP,CHOLECYSTECTOMY/GRAPH: Performed by: ANESTHESIOLOGIST ASSISTANT

## 2025-02-16 PROCEDURE — 7100000002 HC RECOVERY ROOM TIME - EACH INCREMENTAL 1 MINUTE: Performed by: SURGERY

## 2025-02-16 PROCEDURE — 80053 COMPREHEN METABOLIC PANEL: CPT | Performed by: ADVANCED PRACTICE MIDWIFE

## 2025-02-16 PROCEDURE — 87636 SARSCOV2 & INF A&B AMP PRB: CPT | Performed by: ADVANCED PRACTICE MIDWIFE

## 2025-02-16 PROCEDURE — 2500000001 HC RX 250 WO HCPCS SELF ADMINISTERED DRUGS (ALT 637 FOR MEDICARE OP): Performed by: HOSPITALIST

## 2025-02-16 PROCEDURE — 88304 TISSUE EXAM BY PATHOLOGIST: CPT | Mod: TC,AHULAB,WESLAB | Performed by: NURSE PRACTITIONER

## 2025-02-16 PROCEDURE — 3600000009 HC OR TIME - EACH INCREMENTAL 1 MINUTE - PROCEDURE LEVEL FOUR: Performed by: SURGERY

## 2025-02-16 PROCEDURE — 76705 ECHO EXAM OF ABDOMEN: CPT | Performed by: RADIOLOGY

## 2025-02-16 PROCEDURE — 47563 LAPARO CHOLECYSTECTOMY/GRAPH: CPT | Performed by: SURGERY

## 2025-02-16 PROCEDURE — 83615 LACTATE (LD) (LDH) ENZYME: CPT | Performed by: ADVANCED PRACTICE MIDWIFE

## 2025-02-16 PROCEDURE — C1889 IMPLANT/INSERT DEVICE, NOC: HCPCS | Performed by: SURGERY

## 2025-02-16 PROCEDURE — 85025 COMPLETE CBC W/AUTO DIFF WBC: CPT | Performed by: ADVANCED PRACTICE MIDWIFE

## 2025-02-16 RX ORDER — MORPHINE SULFATE 4 MG/ML
4 INJECTION, SOLUTION INTRAMUSCULAR; INTRAVENOUS ONCE
Status: COMPLETED | OUTPATIENT
Start: 2025-02-16 | End: 2025-02-16

## 2025-02-16 RX ORDER — KETOROLAC TROMETHAMINE 30 MG/ML
30 INJECTION, SOLUTION INTRAMUSCULAR; INTRAVENOUS EVERY 8 HOURS PRN
Status: DISCONTINUED | OUTPATIENT
Start: 2025-02-16 | End: 2025-02-16

## 2025-02-16 RX ORDER — OXYCODONE AND ACETAMINOPHEN 5; 325 MG/1; MG/1
1 TABLET ORAL EVERY 4 HOURS PRN
Status: DISCONTINUED | OUTPATIENT
Start: 2025-02-16 | End: 2025-02-16 | Stop reason: HOSPADM

## 2025-02-16 RX ORDER — LIDOCAINE HYDROCHLORIDE 20 MG/ML
INJECTION, SOLUTION EPIDURAL; INFILTRATION; INTRACAUDAL; PERINEURAL AS NEEDED
Status: DISCONTINUED | OUTPATIENT
Start: 2025-02-16 | End: 2025-02-16

## 2025-02-16 RX ORDER — ALBUTEROL SULFATE 0.83 MG/ML
2.5 SOLUTION RESPIRATORY (INHALATION) ONCE AS NEEDED
Status: DISCONTINUED | OUTPATIENT
Start: 2025-02-16 | End: 2025-02-16 | Stop reason: HOSPADM

## 2025-02-16 RX ORDER — ONDANSETRON HYDROCHLORIDE 2 MG/ML
4 INJECTION, SOLUTION INTRAVENOUS ONCE AS NEEDED
Status: DISCONTINUED | OUTPATIENT
Start: 2025-02-16 | End: 2025-02-16 | Stop reason: HOSPADM

## 2025-02-16 RX ORDER — ONDANSETRON HYDROCHLORIDE 2 MG/ML
4 INJECTION, SOLUTION INTRAVENOUS EVERY 8 HOURS PRN
Status: DISCONTINUED | OUTPATIENT
Start: 2025-02-16 | End: 2025-02-16 | Stop reason: HOSPADM

## 2025-02-16 RX ORDER — ONDANSETRON HYDROCHLORIDE 2 MG/ML
4 INJECTION, SOLUTION INTRAVENOUS ONCE
Status: COMPLETED | OUTPATIENT
Start: 2025-02-16 | End: 2025-02-16

## 2025-02-16 RX ORDER — SODIUM CHLORIDE 0.9 G/100ML
INJECTION, SOLUTION IRRIGATION AS NEEDED
Status: DISCONTINUED | OUTPATIENT
Start: 2025-02-16 | End: 2025-02-16 | Stop reason: HOSPADM

## 2025-02-16 RX ORDER — KETOROLAC TROMETHAMINE 30 MG/ML
15 INJECTION, SOLUTION INTRAMUSCULAR; INTRAVENOUS EVERY 6 HOURS
Status: DISCONTINUED | OUTPATIENT
Start: 2025-02-16 | End: 2025-02-16 | Stop reason: HOSPADM

## 2025-02-16 RX ORDER — OXYCODONE HYDROCHLORIDE 5 MG/1
5 TABLET ORAL EVERY 4 HOURS PRN
Status: DISCONTINUED | OUTPATIENT
Start: 2025-02-16 | End: 2025-02-16 | Stop reason: HOSPADM

## 2025-02-16 RX ORDER — OXYCODONE AND ACETAMINOPHEN 5; 325 MG/1; MG/1
1 TABLET ORAL EVERY 6 HOURS PRN
Status: DISCONTINUED | OUTPATIENT
Start: 2025-02-16 | End: 2025-02-16 | Stop reason: HOSPADM

## 2025-02-16 RX ORDER — SODIUM CHLORIDE 9 MG/ML
100 INJECTION, SOLUTION INTRAVENOUS CONTINUOUS
Status: DISCONTINUED | OUTPATIENT
Start: 2025-02-16 | End: 2025-02-16

## 2025-02-16 RX ORDER — OXYCODONE AND ACETAMINOPHEN 5; 325 MG/1; MG/1
1 TABLET ORAL EVERY 6 HOURS PRN
Qty: 8 TABLET | Refills: 0 | Status: SHIPPED | OUTPATIENT
Start: 2025-02-16 | End: 2025-02-18

## 2025-02-16 RX ORDER — KETOROLAC TROMETHAMINE 30 MG/ML
INJECTION, SOLUTION INTRAMUSCULAR; INTRAVENOUS AS NEEDED
Status: DISCONTINUED | OUTPATIENT
Start: 2025-02-16 | End: 2025-02-16

## 2025-02-16 RX ORDER — POLYETHYLENE GLYCOL 3350 17 G/17G
17 POWDER, FOR SOLUTION ORAL DAILY
Status: DISCONTINUED | OUTPATIENT
Start: 2025-02-16 | End: 2025-02-16 | Stop reason: HOSPADM

## 2025-02-16 RX ORDER — BUPIVACAINE HYDROCHLORIDE 5 MG/ML
INJECTION, SOLUTION PERINEURAL AS NEEDED
Status: DISCONTINUED | OUTPATIENT
Start: 2025-02-16 | End: 2025-02-16 | Stop reason: HOSPADM

## 2025-02-16 RX ORDER — MIDAZOLAM HYDROCHLORIDE 1 MG/ML
INJECTION INTRAMUSCULAR; INTRAVENOUS AS NEEDED
Status: DISCONTINUED | OUTPATIENT
Start: 2025-02-16 | End: 2025-02-16

## 2025-02-16 RX ORDER — SENNOSIDES 8.6 MG/1
1 TABLET ORAL 2 TIMES DAILY
Qty: 10 TABLET | Refills: 0 | Status: ON HOLD | OUTPATIENT
Start: 2025-02-16 | End: 2025-02-27

## 2025-02-16 RX ORDER — FENTANYL CITRATE 50 UG/ML
INJECTION, SOLUTION INTRAMUSCULAR; INTRAVENOUS AS NEEDED
Status: DISCONTINUED | OUTPATIENT
Start: 2025-02-16 | End: 2025-02-16

## 2025-02-16 RX ORDER — ROCURONIUM BROMIDE 10 MG/ML
INJECTION, SOLUTION INTRAVENOUS AS NEEDED
Status: DISCONTINUED | OUTPATIENT
Start: 2025-02-16 | End: 2025-02-16

## 2025-02-16 RX ORDER — PANTOPRAZOLE SODIUM 40 MG/10ML
40 INJECTION, POWDER, LYOPHILIZED, FOR SOLUTION INTRAVENOUS DAILY
Status: DISCONTINUED | OUTPATIENT
Start: 2025-02-16 | End: 2025-02-16 | Stop reason: HOSPADM

## 2025-02-16 RX ORDER — ONDANSETRON 4 MG/1
4 TABLET, ORALLY DISINTEGRATING ORAL EVERY 8 HOURS PRN
Status: DISCONTINUED | OUTPATIENT
Start: 2025-02-16 | End: 2025-02-16 | Stop reason: HOSPADM

## 2025-02-16 RX ORDER — SENNOSIDES 8.6 MG/1
2 TABLET ORAL 2 TIMES DAILY
Status: DISCONTINUED | OUTPATIENT
Start: 2025-02-16 | End: 2025-02-16 | Stop reason: HOSPADM

## 2025-02-16 RX ORDER — HEPARIN SODIUM 5000 [USP'U]/ML
5000 INJECTION, SOLUTION INTRAVENOUS; SUBCUTANEOUS EVERY 8 HOURS
Status: DISCONTINUED | OUTPATIENT
Start: 2025-02-17 | End: 2025-02-16 | Stop reason: HOSPADM

## 2025-02-16 RX ORDER — ONDANSETRON 4 MG/1
4 TABLET, ORALLY DISINTEGRATING ORAL EVERY 8 HOURS PRN
Qty: 6 TABLET | Refills: 0 | Status: SHIPPED | OUTPATIENT
Start: 2025-02-16 | End: 2025-02-18

## 2025-02-16 RX ORDER — PROPOFOL 10 MG/ML
INJECTION, EMULSION INTRAVENOUS AS NEEDED
Status: DISCONTINUED | OUTPATIENT
Start: 2025-02-16 | End: 2025-02-16

## 2025-02-16 RX ORDER — ACETAMINOPHEN 325 MG/1
650 TABLET ORAL EVERY 4 HOURS PRN
Status: DISCONTINUED | OUTPATIENT
Start: 2025-02-16 | End: 2025-02-16 | Stop reason: HOSPADM

## 2025-02-16 RX ORDER — MORPHINE SULFATE 2 MG/ML
1 INJECTION, SOLUTION INTRAMUSCULAR; INTRAVENOUS EVERY 4 HOURS PRN
Status: DISCONTINUED | OUTPATIENT
Start: 2025-02-16 | End: 2025-02-16

## 2025-02-16 RX ORDER — MORPHINE SULFATE 2 MG/ML
2 INJECTION, SOLUTION INTRAMUSCULAR; INTRAVENOUS EVERY 4 HOURS PRN
Status: DISCONTINUED | OUTPATIENT
Start: 2025-02-16 | End: 2025-02-16

## 2025-02-16 RX ADMIN — SUGAMMADEX 200 MG: 100 INJECTION, SOLUTION INTRAVENOUS at 13:06

## 2025-02-16 RX ADMIN — FENTANYL CITRATE 75 MCG: 50 INJECTION, SOLUTION INTRAMUSCULAR; INTRAVENOUS at 12:35

## 2025-02-16 RX ADMIN — SENNOSIDES 17.2 MG: 8.6 TABLET, FILM COATED ORAL at 09:18

## 2025-02-16 RX ADMIN — ACETAMINOPHEN 650 MG: 325 TABLET, FILM COATED ORAL at 13:38

## 2025-02-16 RX ADMIN — ROCURONIUM BROMIDE 50 MG: 10 INJECTION, SOLUTION INTRAVENOUS at 12:23

## 2025-02-16 RX ADMIN — ONDANSETRON 4 MG: 2 INJECTION, SOLUTION INTRAMUSCULAR; INTRAVENOUS at 05:17

## 2025-02-16 RX ADMIN — FENTANYL CITRATE 25 MCG: 50 INJECTION, SOLUTION INTRAMUSCULAR; INTRAVENOUS at 12:23

## 2025-02-16 RX ADMIN — PROPOFOL 200 MG: 10 INJECTION, EMULSION INTRAVENOUS at 12:23

## 2025-02-16 RX ADMIN — MORPHINE SULFATE 4 MG: 4 INJECTION, SOLUTION INTRAMUSCULAR; INTRAVENOUS at 10:57

## 2025-02-16 RX ADMIN — ONDANSETRON 4 MG: 2 INJECTION, SOLUTION INTRAMUSCULAR; INTRAVENOUS at 13:00

## 2025-02-16 RX ADMIN — PIPERACILLIN SODIUM AND TAZOBACTAM SODIUM 3.38 G: 3; .375 INJECTION, SOLUTION INTRAVENOUS at 05:18

## 2025-02-16 RX ADMIN — SODIUM CHLORIDE 100 ML/HR: 9 INJECTION, SOLUTION INTRAVENOUS at 09:18

## 2025-02-16 RX ADMIN — KETOROLAC TROMETHAMINE 30 MG: 30 INJECTION, SOLUTION INTRAMUSCULAR at 09:13

## 2025-02-16 RX ADMIN — LIDOCAINE HYDROCHLORIDE 100 MG: 20 INJECTION, SOLUTION EPIDURAL; INFILTRATION; INTRACAUDAL; PERINEURAL at 12:23

## 2025-02-16 RX ADMIN — DEXAMETHASONE SODIUM PHOSPHATE 8 MG: 4 INJECTION, SOLUTION INTRAMUSCULAR; INTRAVENOUS at 12:31

## 2025-02-16 RX ADMIN — MIDAZOLAM HYDROCHLORIDE 2 MG: 1 INJECTION, SOLUTION INTRAMUSCULAR; INTRAVENOUS at 12:17

## 2025-02-16 RX ADMIN — ROCURONIUM BROMIDE 20 MG: 10 INJECTION, SOLUTION INTRAVENOUS at 12:38

## 2025-02-16 RX ADMIN — MORPHINE SULFATE 4 MG: 4 INJECTION, SOLUTION INTRAMUSCULAR; INTRAVENOUS at 05:17

## 2025-02-16 RX ADMIN — OXYCODONE HYDROCHLORIDE AND ACETAMINOPHEN 1 TABLET: 5; 325 TABLET ORAL at 02:56

## 2025-02-16 RX ADMIN — ONDANSETRON 4 MG: 2 INJECTION, SOLUTION INTRAMUSCULAR; INTRAVENOUS at 00:33

## 2025-02-16 RX ADMIN — KETOROLAC TROMETHAMINE 30 MG: 30 INJECTION, SOLUTION INTRAMUSCULAR at 13:02

## 2025-02-16 RX ADMIN — TAZOBACTAM SODIUM AND PIPERACILLIN SODIUM 3.38 G: 375; 3 INJECTION, SOLUTION INTRAVENOUS at 12:30

## 2025-02-16 RX ADMIN — Medication 6 L/MIN: at 13:19

## 2025-02-16 SDOH — SOCIAL STABILITY: SOCIAL INSECURITY: ARE THERE ANY APPARENT SIGNS OF INJURIES/BEHAVIORS THAT COULD BE RELATED TO ABUSE/NEGLECT?: NO

## 2025-02-16 SDOH — SOCIAL STABILITY: SOCIAL INSECURITY: HAS ANYONE EVER THREATENED TO HURT YOUR FAMILY OR YOUR PETS?: NO

## 2025-02-16 SDOH — SOCIAL STABILITY: SOCIAL INSECURITY: WITHIN THE LAST YEAR, HAVE YOU BEEN HUMILIATED OR EMOTIONALLY ABUSED IN OTHER WAYS BY YOUR PARTNER OR EX-PARTNER?: NO

## 2025-02-16 SDOH — SOCIAL STABILITY: SOCIAL INSECURITY: ARE YOU OR HAVE YOU BEEN THREATENED OR ABUSED PHYSICALLY, EMOTIONALLY, OR SEXUALLY BY ANYONE?: NO

## 2025-02-16 SDOH — ECONOMIC STABILITY: FOOD INSECURITY: WITHIN THE PAST 12 MONTHS, YOU WORRIED THAT YOUR FOOD WOULD RUN OUT BEFORE YOU GOT THE MONEY TO BUY MORE.: NEVER TRUE

## 2025-02-16 SDOH — SOCIAL STABILITY: SOCIAL INSECURITY: WITHIN THE LAST YEAR, HAVE YOU BEEN AFRAID OF YOUR PARTNER OR EX-PARTNER?: NO

## 2025-02-16 SDOH — SOCIAL STABILITY: SOCIAL INSECURITY: ABUSE: ADULT

## 2025-02-16 SDOH — SOCIAL STABILITY: SOCIAL INSECURITY: WERE YOU ABLE TO COMPLETE ALL THE BEHAVIORAL HEALTH SCREENINGS?: YES

## 2025-02-16 SDOH — SOCIAL STABILITY: SOCIAL INSECURITY: DO YOU FEEL ANYONE HAS EXPLOITED OR TAKEN ADVANTAGE OF YOU FINANCIALLY OR OF YOUR PERSONAL PROPERTY?: NO

## 2025-02-16 SDOH — SOCIAL STABILITY: SOCIAL INSECURITY: HAVE YOU HAD ANY THOUGHTS OF HARMING ANYONE ELSE?: NO

## 2025-02-16 SDOH — ECONOMIC STABILITY: TRANSPORTATION INSECURITY: IN THE PAST 12 MONTHS, HAS LACK OF TRANSPORTATION KEPT YOU FROM MEDICAL APPOINTMENTS OR FROM GETTING MEDICATIONS?: NO

## 2025-02-16 SDOH — ECONOMIC STABILITY: FOOD INSECURITY: WITHIN THE PAST 12 MONTHS, THE FOOD YOU BOUGHT JUST DIDN'T LAST AND YOU DIDN'T HAVE MONEY TO GET MORE.: NEVER TRUE

## 2025-02-16 SDOH — SOCIAL STABILITY: SOCIAL INSECURITY: HAVE YOU HAD THOUGHTS OF HARMING ANYONE ELSE?: NO

## 2025-02-16 SDOH — SOCIAL STABILITY: SOCIAL INSECURITY: DOES ANYONE TRY TO KEEP YOU FROM HAVING/CONTACTING OTHER FRIENDS OR DOING THINGS OUTSIDE YOUR HOME?: NO

## 2025-02-16 SDOH — SOCIAL STABILITY: SOCIAL INSECURITY: DO YOU FEEL UNSAFE GOING BACK TO THE PLACE WHERE YOU ARE LIVING?: NO

## 2025-02-16 SDOH — ECONOMIC STABILITY: FOOD INSECURITY: HOW HARD IS IT FOR YOU TO PAY FOR THE VERY BASICS LIKE FOOD, HOUSING, MEDICAL CARE, AND HEATING?: NOT HARD AT ALL

## 2025-02-16 ASSESSMENT — PAIN SCALES - GENERAL
PAINLEVEL_OUTOF10: 3
PAINLEVEL_OUTOF10: 0 - NO PAIN
PAINLEVEL_OUTOF10: 0 - NO PAIN
PAINLEVEL_OUTOF10: 3
PAINLEVEL_OUTOF10: 8
PAINLEVEL_OUTOF10: 6
PAINLEVEL_OUTOF10: 9
PAINLEVEL_OUTOF10: 3
PAINLEVEL_OUTOF10: 9
PAINLEVEL_OUTOF10: 0 - NO PAIN
PAINLEVEL_OUTOF10: 9
PAINLEVEL_OUTOF10: 3

## 2025-02-16 ASSESSMENT — COGNITIVE AND FUNCTIONAL STATUS - GENERAL
DAILY ACTIVITIY SCORE: 24
MOBILITY SCORE: 24
PATIENT BASELINE BEDBOUND: NO

## 2025-02-16 ASSESSMENT — ACTIVITIES OF DAILY LIVING (ADL)
PATIENT'S MEMORY ADEQUATE TO SAFELY COMPLETE DAILY ACTIVITIES?: YES
FEEDING YOURSELF: INDEPENDENT
BATHING: INDEPENDENT
TOILETING: INDEPENDENT
ADEQUATE_TO_COMPLETE_ADL: YES
LACK_OF_TRANSPORTATION: NO
HEARING - LEFT EAR: FUNCTIONAL
JUDGMENT_ADEQUATE_SAFELY_COMPLETE_DAILY_ACTIVITIES: YES
DRESSING YOURSELF: INDEPENDENT
GROOMING: INDEPENDENT
WALKS IN HOME: INDEPENDENT
LACK_OF_TRANSPORTATION: NO
HEARING - RIGHT EAR: FUNCTIONAL

## 2025-02-16 ASSESSMENT — PAIN DESCRIPTION - DESCRIPTORS
DESCRIPTORS: SORE

## 2025-02-16 ASSESSMENT — LIFESTYLE VARIABLES
AUDIT-C TOTAL SCORE: 0
HOW OFTEN DO YOU HAVE 6 OR MORE DRINKS ON ONE OCCASION: NEVER
HOW MANY STANDARD DRINKS CONTAINING ALCOHOL DO YOU HAVE ON A TYPICAL DAY: PATIENT DOES NOT DRINK
SKIP TO QUESTIONS 9-10: 1
AUDIT-C TOTAL SCORE: 0
HOW OFTEN DO YOU HAVE A DRINK CONTAINING ALCOHOL: NEVER

## 2025-02-16 ASSESSMENT — PAIN DESCRIPTION - LOCATION
LOCATION: BACK
LOCATION: BACK

## 2025-02-16 ASSESSMENT — PATIENT HEALTH QUESTIONNAIRE - PHQ9
2. FEELING DOWN, DEPRESSED OR HOPELESS: NOT AT ALL
1. LITTLE INTEREST OR PLEASURE IN DOING THINGS: NOT AT ALL
SUM OF ALL RESPONSES TO PHQ9 QUESTIONS 1 & 2: 0

## 2025-02-16 ASSESSMENT — PAIN - FUNCTIONAL ASSESSMENT
PAIN_FUNCTIONAL_ASSESSMENT: UNABLE TO SELF-REPORT
PAIN_FUNCTIONAL_ASSESSMENT: 0-10

## 2025-02-16 ASSESSMENT — PAIN DESCRIPTION - ORIENTATION
ORIENTATION: UPPER
ORIENTATION: UPPER

## 2025-02-16 NOTE — ANESTHESIA PREPROCEDURE EVALUATION
Patient: Karely Sharp    Procedure Information       Date/Time: 25 1200    Procedure: CHOLECYSTECTOMY, LAPAROSCOPIC, WITH CHOLANGIOGRAM    [facility]    Surgeons: Omero Dickerson MD            Relevant Problems   Cardiac   (+) Chronic hypertension affecting pregnancy ([facility]HS-HCC)      Liver   (+) Acute cholecystitis   (+) Calculus of gallbladder with acute cholecystitis without obstruction      GYN   (+) 36 weeks gestation of pregnancy ([facility]HS-HCC)       Clinical information reviewed:     Meds                Past Medical History:   Diagnosis Date    HTN (hypertension)       Past Surgical History:   Procedure Laterality Date     SECTION, LOW TRANSVERSE  02/10/2025    NECK SURGERY      lymph node removal     Social History     Tobacco Use    Smoking status: Never    Smokeless tobacco: Never   Vaping Use    Vaping status: Never Used   Substance Use Topics    Alcohol use: Not Currently    Drug use: Not Currently     Types: Marijuana      Current Outpatient Medications   Medication Instructions    acetaminophen (TYLENOL) 975 mg, oral, Every 6 hours    ibuprofen 600 mg, oral, Every 6 hours    naloxone (NARCAN) 4 mg, nasal, As needed, May repeat every 2-3 minutes if needed, alternating nostrils, until medical assistance becomes available.    NIFEdipine ER (ADALAT CC) 60 mg, oral, 2 times daily, Do not crush, chew, or split.    norethindrone (MICRONOR) 0.35 mg, oral, Daily    oxyCODONE (ROXICODONE) 5 mg, oral, Every 4 hours PRN    polyethylene glycol (GLYCOLAX, MIRALAX) 17 g, oral, Daily    prenatal vitamin calcium-iron-folic 27 mg iron- 1 mg tablet 1 tablet, oral, Daily    simethicone (MYLICON) 80 mg, oral, Every 6 hours PRN      No Known Allergies     Chemistry    Lab Results   Component Value Date/Time     2025 0040    K 5.4 (H) 2025 0040     2025 0040    CO2 24 2025 0040    BUN 20 2025 004    CREATININE 0.77 2025    Lab  "Results   Component Value Date/Time    CALCIUM 9.4 02/16/2025 0040    ALKPHOS 162 (H) 02/16/2025 0040    AST 99 (H) 02/16/2025 0040     (H) 02/16/2025 0040    BILITOT 0.5 02/16/2025 0040          Lab Results   Component Value Date    HGBA1C 5.6 09/19/2024     Lab Results   Component Value Date/Time    WBC 17.1 (H) 02/16/2025 0040    HGB 13.0 02/16/2025 0040    HCT 39.9 02/16/2025 0040     (H) 02/16/2025 0040     Lab Results   Component Value Date/Time    PROTIME 13.2 (H) 02/16/2025 0510    INR 1.2 (H) 02/16/2025 0510     No results found for: \"ABORH\"  No results found for this or any previous visit (from the past 4464 hours).  No results found for this or any previous visit from the past 1095 days.       Visit Vitals  BP (!) 145/95 (BP Location: Right arm, Patient Position: Lying)   Pulse 65   Temp 36.2 °C (97.2 °F) (Temporal)   Resp 16   LMP 05/03/2024 (Approximate)   SpO2 100%   OB Status Recent pregnancy   Smoking Status Never     No data recorded    Physical Exam    Airway  Mallampati: III  TM distance: >3 FB  Neck ROM: full     Cardiovascular - normal exam  Rhythm: regular  Rate: normal     Dental    Pulmonary - normal exam     Abdominal - normal exam             Anesthesia Plan    History of general anesthesia?: yes  History of complications of general anesthesia?: no    ASA 3 - emergent     general   (GETA with standard ASA monitoring)  intravenous induction   Postoperative administration of opioids is intended.  Anesthetic plan and risks discussed with patient.    Plan discussed with CAA and CRNA.        "

## 2025-02-16 NOTE — LETTER
February 16, 2025     Patient: Karely Sharp   Mother of Patient Stevenson Mayorga   Date of Visit: 2/16/2025       To Whom It May Concern:    Karely Sharp was seen in the hospital on 2/16/2025 at . Please excuse Stevenson Mayorga, mother of the patient, for her absence from work 2/16/2025-2/18/2025    If you have any questions or concerns, please don't hesitate to call.         Sincerely,     Apurva Birmingham PA-C

## 2025-02-16 NOTE — OP NOTE
CHOLECYSTECTOMY, LAPAROSCOPIC, WITH CHOLANGIOGRAM Operative Note     Date: 2025  OR Location: [facility] A OR    Name: Karely Sharp, : 1990, Age: 34 y.o., MRN: [id number], Sex: female    Diagnosis  Pre-op Diagnosis      * Calculus of gallbladder with acute cholecystitis without obstruction [K80.00] Post-op Diagnosis     * Calculus of gallbladder with acute cholecystitis without obstruction [K80.00]     Procedures  CHOLECYSTECTOMY, LAPAROSCOPIC, WITH CHOLANGIOGRAM  40171 - IN LAPS SURG CHOLECYSTECTOMY W/CHOLANGIOGRAPHY      Surgeons      * Omero Dickerson - Primary    Resident/Fellow/Other Assistant:  Surgeons and Role:  * No surgeons found with a matching role *    Staff:   Circulator: Dee  Circulator: Rachana Shaikh Person: Zac Shaikh Person: Ermias    Anesthesia Staff: Anesthesiologist: Conrad Dubose MD  C-AA: EMILY Howell    Procedure Summary  Anesthesia: General  ASA: III  Estimated Blood Loss: 3mL  Intra-op Medications:   Administrations occurring from 1200 to 1340 on 25:   Medication Name Total Dose   ioversol (Optiray-320) injection 7 mL   BUPivacaine HCl (Marcaine) 0.5 % (5 mg/mL) injection 25 mL   sodium chloride 0.9 % irrigation solution 1,000 mL   ampicillin-sulbactam (Unasyn) 3 g in sodium chloride 0.9%  mL Cannot be calculated   dexAMETHasone (Decadron) injection 4 mg/mL 8 mg   fentaNYL (Sublimaze) injection 50 mcg/mL 100 mcg   ketorolac (Toradol) injection 30 mg Cannot be calculated   ketorolac (Toradol) injection 30 mg 30 mg   lidocaine PF (Xylocaine-MPF) local injection 2 % 100 mg   midazolam PF (Versed) injection 1 mg/mL 2 mg   pantoprazole (ProtoNix) injection 40 mg Cannot be calculated   piperacillin-tazobactam (Zosyn) IV 3.375 g in 50 mL dextrose (iso) - premix 3.375 g   propofol (Diprivan) injection 10 mg/mL 200 mg   rocuronium (ZeMuron) 50 mg/5 mL injection 70 mg   sugammadex (Bridion) 200 mg/2 mL injection 200 mg              Anesthesia Record                Intraprocedure I/O Totals          Output    Est. Blood Loss 5 mL    Total Output 5 mL          Specimen:   ID Type Source Tests Collected by Time   1 : GALL BLADDER Tissue GALLBLADDER CHOLECYSTECTOMY SURGICAL PATHOLOGY EXAM Omero Dickerson MD 2/16/2025 1238                 Drains and/or Catheters:   [REMOVED] NG/OG/Feeding Tube OG - [place] sump 16 Fr Center mouth (Removed)       Tourniquet Times:         Implants:     Findings: stones in neck, distended GB under tension. IOC normal    Indications: Karely Sharp is an 34 y.o. female who is having surgery for Calculus of gallbladder with acute cholecystitis without obstruction [K80.00].     The patient was seen in the preoperative area. The risks, benefits, complications, treatment options, non-operative alternatives, expected recovery and outcomes were discussed with the patient. The possibilities of reaction to medication, pulmonary aspiration, injury to surrounding structures, bleeding, recurrent infection, the need for additional procedures, failure to diagnose a condition, and creating a complication requiring transfusion or operation were discussed with the patient. The patient concurred with the proposed plan, giving informed consent.  The site of surgery was properly noted/marked if necessary per policy. The patient has been actively warmed in preoperative area. Preoperative antibiotics have been ordered and given within 1 hours of incision. Venous thrombosis prophylaxis have been ordered including bilateral sequential compression devices    Procedure Details:  Description:  Patient was brought to the operating room placed supine on the table.  Timeout was performed which confirmed patient and procedure.  Perioperative antibiotics were administered.  Gen. anesthesia was administered through an endotracheal tube.  The abdomen was prepped and draped sterilely.    I injected half percent Marcaine and then made a sharp infraumbilical incision with the  knife.  Sharp incision was made in the fascia.  The peritoneal cavity was entered under direct visualization and a Rosalina port was placed.  The abdomen was insufflated and the patient was placed in steep reverse Trendelenburg.  A 5 mm 30° camera was introduced.  I placed a right upper quadrant 5 mm port and another 5 mL port in the midline subxiphoid area.  The gallbladder was visualized.  Gallbladder very tense and distended.  I had to decompress it with a needle syringe she had the subxiphoid port.  Several stones noted in the neck of the gallbladder.  It was then grasped and retracted superiorly into the right.  Meticulous dissection was then performed in the area of Calot triangle.  Critical view was achieved.  Posterior cystic plate visualized.  The cystic artery and cystic duct were skeletonized.  The artery was divided between hemoclips.  Endo Clip placed on the gallbladder side and then made a ductotomy with EndoShears.  Ranfac cholangiocatheter was inserted through a separate angiocatheter sheath.  Cholangiogram was obtained using C-arm fluoroscopy.  The anatomy was noted to be normal.  No obvious filling defects seen. Ranfac catheter removed.  I placed 3 clips on the distal cystic duct and one toward the gallbladder and divided with EndoShears.  The gallbladder was then dissected atraumatically out of the liver bed with hook cautery.  Once it was liberated it was placed in the Endo Catch bag and brought out through the umbilicus.  Liver bed was inspected and noted to be hemostatic.  Clips were noted to be in good position.  Gentle irrigation was performed.  The effluent was noted to be clear.  The ports were removed under direct visualization.  The abdomen was desufflated.  The fascia at the umbilicus was closed with 0 Vicryl.  Wounds were irrigated.  Skin incisions were closed with 4-0 Biosyn and Dermabond.  Patient was ultimately extubated and transferred to the recovery room in satisfactory  condition.    Complications:  None; patient tolerated the procedure well.    Disposition: PACU - hemodynamically stable.  Condition: stable                 Additional Details:     Attending Attestation: I was present for the entire procedure.    Omero Dickerson  Phone Number: [phone]

## 2025-02-16 NOTE — ANESTHESIA POSTPROCEDURE EVALUATION
Patient: Karely Sharp    Procedure Summary       Date: 02/16/25 Room / Location: [facility] OR 04 / Virtual [facility] OR    Anesthesia Start: 1217 Anesthesia Stop: 1322    Procedure: CHOLECYSTECTOMY, LAPAROSCOPIC, WITH CHOLANGIOGRAM (Abdomen) Diagnosis:       Calculus of gallbladder with acute cholecystitis without obstruction      (Calculus of gallbladder with acute cholecystitis without obstruction [K80.00])    Surgeons: Omero Dickerson MD Responsible Provider: Conrad Dubose MD    Anesthesia Type: general ASA Status: 3 - Emergent            Anesthesia Type: general    Vitals Value Taken Time   /83 02/16/25 1355   Temp 36.2 °C (97.2 °F) 02/16/25 1319   Pulse 69 02/16/25 1400   Resp 14 02/16/25 1345   SpO2 100 % 02/16/25 1400   Vitals shown include unfiled device data.    Anesthesia Post Evaluation    Patient location during evaluation: PACU  Patient participation: complete - patient participated  Level of consciousness: awake and alert  Pain management: adequate  Airway patency: patent  Cardiovascular status: acceptable and hemodynamically stable  Respiratory status: acceptable, spontaneous ventilation and nonlabored ventilation  Hydration status: acceptable  Postoperative Nausea and Vomiting: none        No notable events documented.

## 2025-02-16 NOTE — ED PROVIDER NOTES
HPI   No chief complaint on file.      34-year-old woman with past medical history of -0-3-2 postop day 6 from  this present with nausea and vomiting x 24 hours.  Has been having intermittent pain for the last 2 to 3 days.  Denies any fever or chills.  Does have worsening right upper quadrant pain.  Associated nausea but one-time vomiting.  No diarrhea.  No dysuria materia flank pain.  No vaginal pain or bleeding or discharge.  No prior history reported of biliary pathology            Patient History   Past Medical History:   Diagnosis Date    Urinary tract infection 2024     Past Surgical History:   Procedure Laterality Date    NECK SURGERY      lymph node removal     Family History   Problem Relation Name Age of Onset    Hypertension Father Denilson Hobbs     Cancer Maternal Grandfather Eliezer Sharp      Social History     Tobacco Use    Smoking status: Never    Smokeless tobacco: Never   Vaping Use    Vaping status: Never Used   Substance Use Topics    Alcohol use: Not Currently    Drug use: Not Currently     Types: Marijuana       Physical Exam   ED Triage Vitals   Temperature Heart Rate Respirations BP   25 0426 25 0425 25 0425 25   36.1 °C (96.9 °F) 69 20 136/89      Pulse Ox Temp Source Heart Rate Source Patient Position   25 0425 25 0426 25 0425 25 042   100 % Temporal Monitor Sitting      BP Location FiO2 (%)     25 --     Right arm        Physical Exam  Vitals and nursing note reviewed.   Constitutional:       General: She is not in acute distress.     Appearance: Normal appearance. She is obese. She is not ill-appearing.   HENT:      Head: Normocephalic and atraumatic.      Nose: Nose normal.      Mouth/Throat:      Mouth: Mucous membranes are dry.      Pharynx: Oropharynx is clear.   Eyes:      Extraocular Movements: Extraocular movements intact.      Conjunctiva/sclera: Conjunctivae normal.      Pupils: Pupils are  equal, round, and reactive to light.   Cardiovascular:      Rate and Rhythm: Normal rate and regular rhythm.      Pulses: Normal pulses.      Heart sounds: Normal heart sounds.   Pulmonary:      Effort: Pulmonary effort is normal.      Breath sounds: Normal breath sounds. No stridor. No wheezing.   Abdominal:      General: Abdomen is flat. There is no distension.      Palpations: Abdomen is soft.      Tenderness: There is abdominal tenderness. There is no right CVA tenderness, left CVA tenderness, guarding or rebound.   Musculoskeletal:         General: No tenderness or deformity. Normal range of motion.      Cervical back: Normal range of motion and neck supple.   Skin:     General: Skin is warm and dry.      Capillary Refill: Capillary refill takes less than 2 seconds.      Coloration: Skin is not pale.      Findings: No bruising.   Neurological:      General: No focal deficit present.      Mental Status: She is alert and oriented to person, place, and time. Mental status is at baseline.      Sensory: No sensory deficit.      Motor: No weakness.   Psychiatric:         Mood and Affect: Mood normal.         Behavior: Behavior normal.         Thought Content: Thought content normal.         Judgment: Judgment normal.           ED Course & MDM                  No data recorded     [place] Coma Scale Score: 15 (02/16/25 0425 : Twyla Trejo RN)                           Medical Decision Making  Ultrasound performed in OB unit did show cholelithiasis versus cholecystitis.  She did have elevated white blood cell count noted at17 and did have mild LFT abnormalities.  COVID and flu were negative and creatinine within normal limits.  UA was negative for infection and surgery was consulted and patient was covered with Zosyn and given morphine.    Amount and/or Complexity of Data Reviewed  Labs: ordered. Decision-making details documented in ED Course.  Radiology: ordered. Decision-making details documented in ED  Course.        Procedure  Procedures     Nick Hercules MD  02/16/25 0604

## 2025-02-16 NOTE — LETTER
February 16, 2025     Patient: Karely Sharp   Mother of the Patient  Stevenson Mayorga   Date of Visit: 2/16/2025       To Whom It May Concern:    Karely Sharp was seen in the hospital on 2/16/2025 at [facility]. Please excuse Stevenson Mayorga, mother of the patient, for her absence from work 2/16/2025- 2/18/2025.    If you have any questions or concerns, please don't hesitate to call.         Sincerely,         Apurva Birmingham PA-C

## 2025-02-16 NOTE — H&P
History of present illness:  Karely Sharp is a 34 y.o. female with PMH of  POD6 from  presenting with n/v for the past 24 hours. She reports these symptoms suddenly came on yesterday and she has been unable to eat/drink. On interview she states her right flank is painful but on examination she's very TTP in the epigastric region. Denies fever, chills, diarrhea, vaginal discomfort/bleeding/discharge.    Past Medical History:  No date: HTN (hypertension)     Full ROS done and negative except as stated above.      Surgical History:  She has a past surgical history that includes Neck surgery and  section, low transverse (02/10/2025).    Social History     Socioeconomic History    Marital status: Single   Tobacco Use    Smoking status: Never    Smokeless tobacco: Never   Vaping Use    Vaping status: Never Used   Substance and Sexual Activity    Alcohol use: Not Currently    Drug use: Not Currently     Types: Marijuana    Sexual activity: Yes     Partners: Male     Birth control/protection: None     Social Drivers of Health     Financial Resource Strain: Low Risk  (2025)    Overall Financial Resource Strain (CARDIA)     Difficulty of Paying Living Expenses: Not hard at all   Food Insecurity: No Food Insecurity (2025)    Hunger Vital Sign     Worried About Running Out of Food in the Last Year: Never true     Ran Out of Food in the Last Year: Never true   Transportation Needs: No Transportation Needs (2025)    PRAPARE - Transportation     Lack of Transportation (Medical): No     Lack of Transportation (Non-Medical): No   Intimate Partner Violence: Not At Risk (2025)    Humiliation, Afraid, Rape, and Kick questionnaire     Fear of Current or Ex-Partner: No     Emotionally Abused: No     Physically Abused: No     Sexually Abused: No       Family History   Problem Relation Name Age of Onset    Hypertension Father Denilson Hobbs     Cancer Maternal Grandfather Eliezer Sharp   "       Home meds:  Current Outpatient Medications   Medication Instructions    acetaminophen (TYLENOL) 975 mg, oral, Every 6 hours    ibuprofen 600 mg, oral, Every 6 hours    naloxone (NARCAN) 4 mg, nasal, As needed, May repeat every 2-3 minutes if needed, alternating nostrils, until medical assistance becomes available.    NIFEdipine ER (ADALAT CC) 60 mg, oral, 2 times daily, Do not crush, chew, or split.    norethindrone (MICRONOR) 0.35 mg, oral, Daily    oxyCODONE (ROXICODONE) 5 mg, oral, Every 4 hours PRN    polyethylene glycol (GLYCOLAX, MIRALAX) 17 g, oral, Daily    prenatal vitamin calcium-iron-folic 27 mg iron- 1 mg tablet 1 tablet, oral, Daily    simethicone (MYLICON) 80 mg, oral, Every 6 hours PRN       Vitals (Last 24 Hours):  Heart Rate:  [65-79]   Temp:  [36.1 °C (96.9 °F)-36.9 °C (98.4 °F)]   Resp:  [16-20]   BP: (129-168)/()   Height:  [160 cm (5' 3\")]   Weight:  [118 kg (261 lb)]   SpO2:  [98 %-100 %]      PHYSICAL EXAM:  Constitutional: NAD, alert and cooperative  Eyes: no icterus  ENMT: mucous membranes moist, no lesions  Head/Neck: supple  Respiratory/Thorax: CTA bilaterally, non-labored breathing, no cough, on RA  Cardiovascular: RRR, no murmurs heard  Gastrointestinal: TTP in epigastrium   : no Rangel, no SP/flank discomfort  Musculoskeletal: no joint swelling, ROM intact  Extremities: no edema  Neurological: non-focal  Skin: warm and dry  Psych: calm, stable mood     MEDS:  [Transfer Hold] ampicillin-sulbactam, 3 g, intravenous, q6h DARLINE  [Transfer Hold] pantoprazole, 40 mg, intravenous, Daily  sennosides, 2 tablet, oral, BID        sodium chloride 0.9%, 100 mL/hr, Last Rate: 100 mL/hr (25 1017)         PRN medications: [Transfer Hold] ketorolac, ondansetron ODT **OR** ondansetron      I have reviewed all imaging reports and labs pertinent to this visit    ASSESSMENT/PLAN:  Karely Sharp is a 34 y.o. female with PMH of  POD6 from  presenting with n/v for the " past 24 hours. She reports these symptoms suddenly came on yesterday and she has been unable to eat/drink. On interview she states her right flank is painful but on examination she's very TTP in the epigastric region. Denies fever, chills, diarrhea, vaginal discomfort/bleeding/discharge.    N/V  Cholecystitis   -TTP in epigastric region  -US gallbladder: Sludge and stones within the dilated gallbladder with distended common bile duct measuring up to 1.2 cm  -general surgery planning for lap fred today  -Continue Unasyn 3 g q 6 hrs   -pain control w/ toradol q 8 hr prn and IV morphine 4 mg x one dose     Other comorbidities as above  -continue medications as ordered and adjust based on clinical course     VTE / GI prophylaxis   -AC on hold due to lap fred planned, PPI    Discharge planning  -HNN when medically ready    Discussed with Dr. Stark and the interdisciplinary team     Apurva Birmingham PA-C

## 2025-02-16 NOTE — ED TRIAGE NOTES
Pt presents to the ED today with a chief complaint of vomiting since yesterday and unable to keep anything that she eats or drinks down since yesterday. Pt states that she has had small bowel movements since her  on the , but has not had a full BM. Pt reports that she also has upper back pain. Pt unable to take her BP medications today.

## 2025-02-16 NOTE — SIGNIFICANT EVENT
Ultrasound gallbladder shows dialated duct and sludge and stones. Will sent to KAREN Terry, DO    
No

## 2025-02-16 NOTE — NURSING NOTE
1319: Patient to bay with anesthesia and surgical team present. Handoff report and plan of care reviewed, all questions answered. VSS.    1323: Patient removed from supplemental O2 at this time    1330: Patient tolerating ice chips at this time    1338: 650mg tylenol administered per given orders, allergies verified prior to administration    1340: Patient tolerating amanda crackers at this time    1350: Report called to [facility] (obs 111) via vocera at this time    1404: Patient transported to room 111 in stable condition via transport at this time.     1406: Family updated in person at this time

## 2025-02-16 NOTE — ED TRIAGE NOTES
From home for c/o nausea and vomiting x2 days. Pt denies SOB, CP and abd pain. Pt was recently dc'd from l&d for gallstones. Pt recently had a baby 2/10/25.

## 2025-02-16 NOTE — H&P
OB Triage H&P    Assessment/Plan    Karely Sharp is a 34 y.o.  POD#6 from PLTCS for failed IOL for cHTN with siPEC w/SF presents with nausea and vomiting for the past 24 hours. States her symptoms came on suddenly yesterday and since then has been unable to keep any food or fluids down. Denies any fever, chills, or sick contacts. Denies diarrhea, endorses multiple small stools since her c/s. Denies HA, RUQ pain, visual changes, CP/SOB. Of note, she has been unable to take her prescribed Nifedipine 60mg PO BID for the past day.     ASSESSMENT:  -POD#6 from PLTCS  -siPEC w/SF, on Nifedipine XR 60mg PO BID, s/p PP magnesium sulfate.   -nausea/vomiting     PLAN:  -4mg if zofran provided in triage  -severe-range BP in ED, mild-range on retake here in L&D triage. Will continue to monitor for now.   -CBC, CMP, UA, and covid/flu swabs ordered.   -Will notify Dr. Terry of pt status and lab results as they come in and defer to him for ongoing management.       Pregnancy Problems (from 24 to present)       Problem Noted Diagnosed Resolved    Chronic hypertension affecting pregnancy ([facility]) 2025 by Rex Hope MD  No    Priority:  Medium       Overview Addendum 2025 10:42 AM by Rex Hope MD     : Blood pressure high mild range in the setting of chronic hypertension.  Will start patient on oral nifedipine XL 30 mg daily.  Patient to obtain ambulatory CBC/CMP/UPC today   : labs reviewed and WNL; BP today WNL on nifed 30mg daily  : /91, current medications today.  Will increase nifedipine to 30 mg twice daily. IOL scheduled for 37 weeks.         36 weeks gestation of pregnancy ([facility]) 2024 by DOV Juarez-CNP  No    Priority:  Medium       Overview Addendum 2025  3:12 PM by Rex Hope MD     Desired provider in labor: [] CNM  [] Physician  [x] Blood Products: [x] Yes, accepts [] No, needs counseling  [x] Initial BMI: 41.28   [x]  Prenatal Labs: Equivocal rubella  [x] Cervical Cancer Screening up to date: WNL  [x] Rh status: Rh+  [x] Genetic Screening: low risk XY  [x] NT US: (11-13 wks) late screen  [x] Baby ASA (if indicated): yes  [x] Pregnancy dated by: us er    [x] Anatomy US: (19-20 wks)    [] Federal Sterilization consent signed (if indicated):  [x] 1hr GCT at 24-28wks:   [x] Rhogam (if indicated): n/a  [x] Fetal Surveillance (if indicated): Weekly NSTs given chronic hypertension  [x] Tdap (27-32 wks, may be given up to 36 wks if initial window missed):   [] RSV (32-36 wks) (Sept. to end of ):   [] Flu Vaccine:    [] Breastfeeding:  [] Postpartum Birth control method:   [x] GBS at 36 - 37 wks: POS  [x] 39 weeks discussion of IOL vs. Expectant management: N/A  [x] Mode of delivery ( anticipated ): IOL at 37 weeks                   Subjective   Karely Sharp is a 34 y.o.  POD#6 from PLTCS for failed IOL for cHTN with siPEC w/SF presents with nausea and vomiting for the past 24 hours. States her symptoms came on suddenly yesterday and since then has been unable to keep any food or fluids down. Denies any fever, chills, or sick contacts. Denies diarrhea, endorses multiple small stools since her c/s. Denies HA, RUQ pain, visual changes, CP/SOB. Of note, she has been unable to take her prescribed Nifedipine 60mg PO BID for the past day.   States bleeding since her c/s has been light and her pain well-managed though she does endorse some incisional pain and tenderness with vomiting.     Prenatal Provider Dr. Hope    OB History    Para Term  AB Living   5 2 2 0 3 2   SAB IAB Ectopic Multiple Live Births   0 3 0 0 2      # Outcome Date GA Lbr Bret/2nd Weight Sex Type Anes PTL Lv   5 Term 02/10/25 37w0d  2.66 kg M CS-LTranv EPI  SHANAE      Complications: Failure to Progress in First Stage      Name: Bobby Cotton      Apgar1: 1  Apgar5: 4   4 Term 13    M Vag-Spont   SHANAE   3 IAB            2 IAB            1 IAB                Obstetric Comments   Patient reports having to do some type of 24-hour urine test with her pregnancy in 2013.  Does not remember having a diagnosis of preeclampsia.       Past Surgical History:   Procedure Laterality Date    NECK SURGERY      lymph node removal       Social History     Tobacco Use    Smoking status: Never    Smokeless tobacco: Never   Substance Use Topics    Alcohol use: Not Currently       No Known Allergies    Medications Prior to Admission   Medication Sig Dispense Refill Last Dose/Taking    acetaminophen (Tylenol) 325 mg tablet Take 3 tablets (975 mg) by mouth every 6 hours. 360 tablet 0     ibuprofen 600 mg tablet Take 1 tablet (600 mg) by mouth every 6 hours. 120 tablet 0     naloxone (Narcan) 4 mg/0.1 mL nasal spray Administer 1 spray (4 mg) into affected nostril(s) if needed for opioid reversal or respiratory depression. May repeat every 2-3 minutes if needed, alternating nostrils, until medical assistance becomes available. 2 each 11     NIFEdipine ER (Adalat CC) 60 mg 24 hr tablet Take 1 tablet (60 mg) by mouth 2 times a day. Do not crush, chew, or split. 60 tablet 3     norethindrone (Micronor) 0.35 mg tablet Take 1 tablet (0.35 mg) over 28 days by mouth once daily. 28 tablet 12     oxyCODONE (Roxicodone) 5 mg immediate release tablet Take 1 tablet (5 mg) by mouth every 4 hours if needed (Pain score 6-8). 8 tablet 0     polyethylene glycol (Glycolax, Miralax) 17 gram packet Take 17 g by mouth once daily. 30 packet 0     prenatal vitamin calcium-iron-folic 27 mg iron- 1 mg tablet Take 1 tablet by mouth once daily. 90 tablet 3     simethicone (Mylicon) 80 mg chewable tablet Chew 1 tablet (80 mg) every 6 hours if needed for flatulence. 30 tablet 0      Objective     Last Vitals  Temp Pulse Resp BP MAP O2 Sat   36.9 °C (98.4 °F) 79 18 (!) 148/77 106 98 %     Blood Pressures         2/16/2025  0023             BP: 148/77             Physical Exam  General: NAD, mood  appropriate  Cardiopulmonary: warm and well perfused, breathing comfortably on room air  Abdomen: soft, fundus firm and below umbilicus, incision clean/dry/intact, TED, no drainage, edema, or tenderness on palpation. RUQ and LLQ mildly tender on palpation though pt states she feels this is soreness from vomiting.   Extremities: Symmetric  Speculum Exam: deferred  Cervix: deferred         Labs in chart were reviewed.   Results from last 7 days   Lab Units 02/11/25  0753 02/09/25  2146   WBC AUTO x10*3/uL 21.1* 18.0*   HEMOGLOBIN g/dL 11.7* 13.2   HEMATOCRIT % 35.9* 42.4   PLATELETS AUTO x10*3/uL 269 303   AST U/L  --  30   ALT U/L  --  27   CREATININE mg/dL  --  0.71        Prenatal labs reviewed, not remarkable.

## 2025-02-16 NOTE — CONSULTS
Reason For Consult  Cholecystitis    Assessment/Plan   Impression:  Symptomatic Cholelithiasis/acute cholecystitis    -I carefully explained the pathophysiology of biliary tract disease using terms and pictures  -Rationale for surgical intervention described  -Technique of laparoscopic cholecystectomy explained (both standard and robotic assisted)  -Risks of surgery elucidated (bleeding, infection, bile leak, CBD injury, etc)  -Other options presented (watchful waiting, avoidance of fatty foods)  -All patient questions answered to her satisfaction.  -Patient has indicated desire to pursue surgical intervention  -Added onto the schedule for today  -Reviewed issues regarding breast-feeding postoperatively    History Of Present Illness  Karely Sharp is a 34 y.o. female presenting with 3 days of severe epigastric right upper quadrant abdominal pain.  Postpartum from [facility] and delivery.  Otherwise pretty healthy.  Imaging suggestive of gallbladder issues     Past Medical History  She has a past medical history of HTN (hypertension).    Surgical History  She has a past surgical history that includes Neck surgery and  section, low transverse (02/10/2025).     Social History  She reports that she has never smoked. She has never used smokeless tobacco. She reports that she does not currently use alcohol. She reports that she does not currently use drugs after having used the following drugs: Marijuana.    Family History  Family History   Problem Relation Name Age of Onset    Hypertension Father Denilson Hobbs     Cancer Maternal Grandfather Eliezer Sharp         Allergies  Patient has no known allergies.    Review of Systems  Constitutional: no weight loss, no fevers, no malaise  HEENT: negative  Neck: negative  Pulmonary: no SOB, no cough  CV: no chest pain, otherwise negative  GI: See HPI  : no hematuria, retention.  Postpartum  MS: no aches/pains  Neurologic: negative  Skin: no rashes,  lesions  HEME: no bleeding tendency, no bruising  Psych: no mood issues    Physical Exam  Constitutional: Mild distress.  Alert and oriented x 3.    Skin: non jaundiced  HEENT: normal  Lungs: clear to auscultation  CV: RRR, S1S2, no murmurs  Abdomen: soft, mild discomfort RUQ.  No obvious hernias.  No diffuse peritoneal signs  MS: grossly normal  Neuro: grossly normal    Last Recorded Vitals  Blood pressure (!) 145/95, pulse 65, temperature 36.2 °C (97.2 °F), temperature source Temporal, resp. rate 16, last menstrual period 05/03/2024, SpO2 100%, currently breastfeeding.    Relevant Results  Lab Results   Component Value Date    WBC 17.1 (H) 02/16/2025    HGB 13.0 02/16/2025    HCT 39.9 02/16/2025    MCV 78 (L) 02/16/2025     (H) 02/16/2025         Sodium   Date Value Ref Range Status   02/16/2025 137 136 - 145 mmol/L Final     Chloride   Date Value Ref Range Status   02/16/2025 102 98 - 107 mmol/L Final     Urea Nitrogen   Date Value Ref Range Status   02/16/2025 20 6 - 23 mg/dL Final         Lab Results   Component Value Date    K 5.4 (H) 02/16/2025    CALCIUM 9.4 02/16/2025      No results found for this or any previous visit from the past 3 days.          I spent 30 minutes in the professional and overall care of this patient.

## 2025-02-16 NOTE — DISCHARGE SUMMARY
Discharge Summary    Admission Date: 2025  Discharge Date: 2025    Discharge Diagnosis  cholilithiasis    Hospital Course  Delivery Date: 2/10/2025 4:15 PM  Delivery type: , Low Transverse   GA at delivery: 37w0d  Outcome: Living  Anesthesia during delivery: Epidural  Intrapartum complications: Failure to Progress in First Stage  Feeding method:       Procedures: none  Contraception at discharge: none      Pertinent Physical Exam At Time of Discharge    General: Examination reveals a well developed, well nourished, female, in no acute distress. She is alert and cooperative.  HEENT: PERRLA. External ears normal. Nose normal, no erythema or discharge. Mouth and throat clear.  Neck: supple, no significant adenopathy.  Abdomen: diffuse tendernesspos bravo's sign.  Extremities: no redness or tenderness in the calves or thighs, no edema.    Last Vitals:  Temp Pulse Resp BP MAP Pulse Ox   36.9 °C (98.4 °F) 79 18 (!) 148/77 106 98 %     Discharge Meds     Your medication list        CONTINUE taking these medications        Instructions Last Dose Given Next Dose Due   acetaminophen 325 mg tablet  Commonly known as: Tylenol      Take 3 tablets (975 mg) by mouth every 6 hours.       ibuprofen 600 mg tablet      Take 1 tablet (600 mg) by mouth every 6 hours.       naloxone 4 mg/0.1 mL nasal spray  Commonly known as: Narcan      Administer 1 spray (4 mg) into affected nostril(s) if needed for opioid reversal or respiratory depression. May repeat every 2-3 minutes if needed, alternating nostrils, until medical assistance becomes available.       NIFEdipine ER 60 mg 24 hr tablet  Commonly known as: Adalat CC      Take 1 tablet (60 mg) by mouth 2 times a day. Do not crush, chew, or split.       norethindrone 0.35 mg tablet  Commonly known as: Micronor      Take 1 tablet (0.35 mg) over 28 days by mouth once daily.       oxyCODONE 5 mg immediate release tablet  Commonly known as: Roxicodone      Take 1 tablet (5  mg) by mouth every 4 hours if needed (Pain score 6-8).       polyethylene glycol 17 gram packet  Commonly known as: Glycolax, Miralax      Take 17 g by mouth once daily.       prenatal vitamin calcium-iron-folic 27 mg iron- 1 mg tablet      Take 1 tablet by mouth once daily.       simethicone 80 mg chewable tablet  Commonly known as: Mylicon      Chew 1 tablet (80 mg) every 6 hours if needed for flatulence.                 Complications Requiring Follow-Up  To er    Test Results Pending At Discharge  Pending Labs       Order Current Status    POCT urinalysis dipstick manually resulted In process            Outpatient Follow-Up  No future appointments.    I spent 21 minutes in the professional and overall care of this patient.      Dhaval Terry, DO

## 2025-02-18 NOTE — DISCHARGE SUMMARY
Discharge Diagnosis  Acute cholecystitis    Issues Requiring Follow-Up  PCP    Discharge Meds     Medication List      START taking these medications     ondansetron ODT 4 mg disintegrating tablet; Commonly known as:   Zofran-ODT; Dissolve 1 tablet (4 mg) in the mouth every 8 hours if needed   for nausea or vomiting for up to 2 days.   oxyCODONE-acetaminophen 5-325 mg tablet; Commonly known as: Percocet;   Take 1 tablet by mouth every 6 hours if needed for severe pain (7 - 10)   for up to 2 days.   sennosides 8.6 mg tablet; Commonly known as: Senokot; Take 1 tablet (8.6   mg) by mouth 2 times a day for 5 days.     CONTINUE taking these medications     acetaminophen 325 mg tablet; Commonly known as: Tylenol; Take 3 tablets   (975 mg) by mouth every 6 hours.   ibuprofen 600 mg tablet; Take 1 tablet (600 mg) by mouth every 6 hours.   naloxone 4 mg/0.1 mL nasal spray; Commonly known as: Narcan; Administer   1 spray (4 mg) into affected nostril(s) if needed for opioid reversal or   respiratory depression. May repeat every 2-3 minutes if needed,   alternating nostrils, until medical assistance becomes available.   NIFEdipine ER 60 mg 24 hr tablet; Commonly known as: Adalat CC; Take 1   tablet (60 mg) by mouth 2 times a day. Do not crush, chew, or split.   norethindrone 0.35 mg tablet; Commonly known as: Micronor; Take 1 tablet   (0.35 mg) over 28 days by mouth once daily.   polyethylene glycol 17 gram packet; Commonly known as: Glycolax,   Miralax; Take 17 g by mouth once daily.   prenatal vitamin calcium-iron-folic 27 mg iron- 1 mg tablet; Take 1   tablet by mouth once daily.   simethicone 80 mg chewable tablet; Commonly known as: Mylicon; Chew 1   tablet (80 mg) every 6 hours if needed for flatulence.     STOP taking these medications     oxyCODONE 5 mg immediate release tablet; Commonly known as: Roxicodone       Test Results Pending At Discharge  Pending Labs       Order Current Status    Surgical Pathology Exam In  process            Hospital Course  Karely Sharp is a 34 y.o. female with PMH of  POD6 from  presenting with n/v for the past 24 hours. She reports these symptoms suddenly came on yesterday and she has been unable to eat/drink. On interview she states her right flank is painful but on examination she's very TTP in the epigastric region. Denies fever, chills, diarrhea, vaginal discomfort/bleeding/discharge.     N/V  Cholecystitis   -TTP in epigastric region  -US gallbladder: Sludge and stones within the dilated gallbladder with distended common bile duct measuring up to 1.2 cm  -general surgery performed lap fred w/o complication  -pt tolerated diet after surgery, expressing wishes to go home to recover  -pt discharged home with zofran and pain medication    Pertinent Physical Exam At Time of Discharge  Physical Exam  Constitutional:       Appearance: Normal appearance.   Cardiovascular:      Rate and Rhythm: Normal rate and regular rhythm.   Pulmonary:      Effort: Pulmonary effort is normal.      Breath sounds: Normal breath sounds.   Abdominal:      General: Abdomen is flat. Bowel sounds are normal.      Palpations: Abdomen is soft.      Tenderness: There is no abdominal tenderness.      Comments: Surgical sites noted clear, dry, and intact w/ dermabond   Neurological:      Mental Status: She is alert.         Outpatient Follow-Up  No future appointments.    Vitals and labs reviewed. Patient stable for discharge. Both plan and discharge discussed with Dr. Stark and the interdisciplinary team.    Apurva Birmingham PA-C

## 2025-02-19 ENCOUNTER — APPOINTMENT (OUTPATIENT)
Dept: RADIOLOGY | Facility: CLINIC | Age: 35
End: 2025-02-19
Payer: MEDICAID

## 2025-02-21 LAB
LABORATORY COMMENT REPORT: NORMAL
PATH REPORT.FINAL DX SPEC: NORMAL
PATH REPORT.GROSS SPEC: NORMAL
PATH REPORT.TOTAL CANCER: NORMAL

## 2025-02-26 ENCOUNTER — HOSPITAL ENCOUNTER (INPATIENT)
Facility: HOSPITAL | Age: 35
LOS: 1 days | Discharge: HOME | End: 2025-02-27
Attending: OBSTETRICS & GYNECOLOGY | Admitting: STUDENT IN AN ORGANIZED HEALTH CARE EDUCATION/TRAINING PROGRAM
Payer: MEDICAID

## 2025-02-26 ENCOUNTER — OFFICE VISIT (OUTPATIENT)
Dept: OBSTETRICS AND GYNECOLOGY | Facility: CLINIC | Age: 35
End: 2025-02-26
Payer: MEDICAID

## 2025-02-26 ENCOUNTER — APPOINTMENT (OUTPATIENT)
Dept: RADIOLOGY | Facility: HOSPITAL | Age: 35
End: 2025-02-26
Payer: MEDICAID

## 2025-02-26 VITALS
WEIGHT: 223.8 LBS | HEIGHT: 63 IN | SYSTOLIC BLOOD PRESSURE: 158 MMHG | DIASTOLIC BLOOD PRESSURE: 111 MMHG | BODY MASS INDEX: 39.65 KG/M2

## 2025-02-26 DIAGNOSIS — Z98.890 POST-OPERATIVE NAUSEA AND VOMITING: ICD-10-CM

## 2025-02-26 DIAGNOSIS — Z48.89 POSTOPERATIVE VISIT: ICD-10-CM

## 2025-02-26 DIAGNOSIS — K59.03 CONSTIPATION DUE TO PAIN MEDICATION: ICD-10-CM

## 2025-02-26 DIAGNOSIS — R11.2 POST-OPERATIVE NAUSEA AND VOMITING: ICD-10-CM

## 2025-02-26 DIAGNOSIS — K59.00 CONSTIPATION, UNSPECIFIED CONSTIPATION TYPE: ICD-10-CM

## 2025-02-26 LAB
ALBUMIN SERPL BCP-MCNC: 3.9 G/DL (ref 3.4–5)
ALP SERPL-CCNC: 137 U/L (ref 33–110)
ALT SERPL W P-5'-P-CCNC: 22 U/L (ref 7–45)
ANION GAP SERPL CALC-SCNC: 12 MMOL/L (ref 10–20)
APPEARANCE UR: ABNORMAL
AST SERPL W P-5'-P-CCNC: 15 U/L (ref 9–39)
BACTERIA #/AREA URNS AUTO: ABNORMAL /HPF
BILIRUB SERPL-MCNC: 0.4 MG/DL (ref 0–1.2)
BILIRUB UR STRIP.AUTO-MCNC: NEGATIVE MG/DL
BUN SERPL-MCNC: 10 MG/DL (ref 6–23)
CALCIUM SERPL-MCNC: 9.1 MG/DL (ref 8.6–10.3)
CHLORIDE SERPL-SCNC: 104 MMOL/L (ref 98–107)
CO2 SERPL-SCNC: 28 MMOL/L (ref 21–32)
COLOR UR: YELLOW
CREAT SERPL-MCNC: 0.67 MG/DL (ref 0.5–1.05)
EGFRCR SERPLBLD CKD-EPI 2021: >90 ML/MIN/1.73M*2
ERYTHROCYTE [DISTWIDTH] IN BLOOD BY AUTOMATED COUNT: 15.1 % (ref 11.5–14.5)
GLUCOSE SERPL-MCNC: 93 MG/DL (ref 74–99)
GLUCOSE UR STRIP.AUTO-MCNC: NORMAL MG/DL
HCT VFR BLD AUTO: 39.4 % (ref 36–46)
HGB BLD-MCNC: 12.8 G/DL (ref 12–16)
HYALINE CASTS #/AREA URNS AUTO: ABNORMAL /LPF
KETONES UR STRIP.AUTO-MCNC: ABNORMAL MG/DL
LDH SERPL L TO P-CCNC: 187 U/L (ref 84–246)
LEUKOCYTE ESTERASE UR QL STRIP.AUTO: ABNORMAL
MCH RBC QN AUTO: 25.2 PG (ref 26–34)
MCHC RBC AUTO-ENTMCNC: 32.5 G/DL (ref 32–36)
MCV RBC AUTO: 78 FL (ref 80–100)
MUCOUS THREADS #/AREA URNS AUTO: ABNORMAL /LPF
NITRITE UR QL STRIP.AUTO: NEGATIVE
NRBC BLD-RTO: 0 /100 WBCS (ref 0–0)
PH UR STRIP.AUTO: 6 [PH]
PLATELET # BLD AUTO: 409 X10*3/UL (ref 150–450)
POTASSIUM SERPL-SCNC: 3.4 MMOL/L (ref 3.5–5.3)
PROT SERPL-MCNC: 7 G/DL (ref 6.4–8.2)
PROT UR STRIP.AUTO-MCNC: ABNORMAL MG/DL
RBC # BLD AUTO: 5.07 X10*6/UL (ref 4–5.2)
RBC # UR STRIP.AUTO: NEGATIVE MG/DL
RBC #/AREA URNS AUTO: ABNORMAL /HPF
SODIUM SERPL-SCNC: 141 MMOL/L (ref 136–145)
SP GR UR STRIP.AUTO: 1.03
SQUAMOUS #/AREA URNS AUTO: ABNORMAL /HPF
UROBILINOGEN UR STRIP.AUTO-MCNC: ABNORMAL MG/DL
WBC # BLD AUTO: 13.2 X10*3/UL (ref 4.4–11.3)
WBC #/AREA URNS AUTO: ABNORMAL /HPF

## 2025-02-26 PROCEDURE — 2500000001 HC RX 250 WO HCPCS SELF ADMINISTERED DRUGS (ALT 637 FOR MEDICARE OP): Performed by: OBSTETRICS & GYNECOLOGY

## 2025-02-26 PROCEDURE — 99215 OFFICE O/P EST HI 40 MIN: CPT | Performed by: STUDENT IN AN ORGANIZED HEALTH CARE EDUCATION/TRAINING PROGRAM

## 2025-02-26 PROCEDURE — 80053 COMPREHEN METABOLIC PANEL: CPT | Performed by: OBSTETRICS & GYNECOLOGY

## 2025-02-26 PROCEDURE — 2500000004 HC RX 250 GENERAL PHARMACY W/ HCPCS (ALT 636 FOR OP/ED): Performed by: OBSTETRICS & GYNECOLOGY

## 2025-02-26 PROCEDURE — 2500000004 HC RX 250 GENERAL PHARMACY W/ HCPCS (ALT 636 FOR OP/ED): Performed by: STUDENT IN AN ORGANIZED HEALTH CARE EDUCATION/TRAINING PROGRAM

## 2025-02-26 PROCEDURE — 2500000002 HC RX 250 W HCPCS SELF ADMINISTERED DRUGS (ALT 637 FOR MEDICARE OP, ALT 636 FOR OP/ED): Performed by: ADVANCED PRACTICE MIDWIFE

## 2025-02-26 PROCEDURE — 2500000005 HC RX 250 GENERAL PHARMACY W/O HCPCS: Performed by: OBSTETRICS & GYNECOLOGY

## 2025-02-26 PROCEDURE — 74176 CT ABD & PELVIS W/O CONTRAST: CPT

## 2025-02-26 PROCEDURE — 85027 COMPLETE CBC AUTOMATED: CPT | Performed by: OBSTETRICS & GYNECOLOGY

## 2025-02-26 PROCEDURE — 36415 COLL VENOUS BLD VENIPUNCTURE: CPT | Performed by: OBSTETRICS & GYNECOLOGY

## 2025-02-26 PROCEDURE — 2500000004 HC RX 250 GENERAL PHARMACY W/ HCPCS (ALT 636 FOR OP/ED): Performed by: ADVANCED PRACTICE MIDWIFE

## 2025-02-26 PROCEDURE — 3008F BODY MASS INDEX DOCD: CPT | Performed by: STUDENT IN AN ORGANIZED HEALTH CARE EDUCATION/TRAINING PROGRAM

## 2025-02-26 PROCEDURE — 81001 URINALYSIS AUTO W/SCOPE: CPT | Performed by: OBSTETRICS & GYNECOLOGY

## 2025-02-26 PROCEDURE — 2500000001 HC RX 250 WO HCPCS SELF ADMINISTERED DRUGS (ALT 637 FOR MEDICARE OP): Performed by: STUDENT IN AN ORGANIZED HEALTH CARE EDUCATION/TRAINING PROGRAM

## 2025-02-26 PROCEDURE — 74176 CT ABD & PELVIS W/O CONTRAST: CPT | Performed by: RADIOLOGY

## 2025-02-26 PROCEDURE — 1036F TOBACCO NON-USER: CPT | Performed by: STUDENT IN AN ORGANIZED HEALTH CARE EDUCATION/TRAINING PROGRAM

## 2025-02-26 PROCEDURE — 3080F DIAST BP >= 90 MM HG: CPT | Performed by: STUDENT IN AN ORGANIZED HEALTH CARE EDUCATION/TRAINING PROGRAM

## 2025-02-26 PROCEDURE — 1220000001 HC OB SEMI-PRIVATE ROOM DAILY

## 2025-02-26 PROCEDURE — 3077F SYST BP >= 140 MM HG: CPT | Performed by: STUDENT IN AN ORGANIZED HEALTH CARE EDUCATION/TRAINING PROGRAM

## 2025-02-26 PROCEDURE — 83615 LACTATE (LD) (LDH) ENZYME: CPT | Performed by: OBSTETRICS & GYNECOLOGY

## 2025-02-26 RX ORDER — NIFEDIPINE 10 MG/1
20 CAPSULE ORAL ONCE AS NEEDED
Status: DISCONTINUED | OUTPATIENT
Start: 2025-02-26 | End: 2025-02-26

## 2025-02-26 RX ORDER — SIMETHICONE 80 MG
80 TABLET,CHEWABLE ORAL 4 TIMES DAILY PRN
Status: DISCONTINUED | OUTPATIENT
Start: 2025-02-26 | End: 2025-02-27 | Stop reason: HOSPADM

## 2025-02-26 RX ORDER — LABETALOL HYDROCHLORIDE 5 MG/ML
20 INJECTION, SOLUTION INTRAVENOUS ONCE AS NEEDED
Status: DISCONTINUED | OUTPATIENT
Start: 2025-02-26 | End: 2025-02-27 | Stop reason: HOSPADM

## 2025-02-26 RX ORDER — ONDANSETRON HYDROCHLORIDE 2 MG/ML
4 INJECTION, SOLUTION INTRAVENOUS EVERY 6 HOURS PRN
Status: DISCONTINUED | OUTPATIENT
Start: 2025-02-26 | End: 2025-02-27 | Stop reason: HOSPADM

## 2025-02-26 RX ORDER — METHYLERGONOVINE MALEATE 0.2 MG/ML
0.2 INJECTION INTRAVENOUS ONCE AS NEEDED
Status: DISCONTINUED | OUTPATIENT
Start: 2025-02-26 | End: 2025-02-27 | Stop reason: HOSPADM

## 2025-02-26 RX ORDER — OXYCODONE HYDROCHLORIDE 5 MG/1
10 TABLET ORAL EVERY 4 HOURS PRN
Status: DISCONTINUED | OUTPATIENT
Start: 2025-02-26 | End: 2025-02-27 | Stop reason: HOSPADM

## 2025-02-26 RX ORDER — NIFEDIPINE 30 MG/1
60 TABLET, FILM COATED, EXTENDED RELEASE ORAL 2 TIMES DAILY
Status: DISCONTINUED | OUTPATIENT
Start: 2025-02-27 | End: 2025-02-27 | Stop reason: HOSPADM

## 2025-02-26 RX ORDER — ONDANSETRON 4 MG/1
4 TABLET, FILM COATED ORAL EVERY 6 HOURS PRN
Status: DISCONTINUED | OUTPATIENT
Start: 2025-02-26 | End: 2025-02-27 | Stop reason: HOSPADM

## 2025-02-26 RX ORDER — LABETALOL HYDROCHLORIDE 5 MG/ML
20 INJECTION, SOLUTION INTRAVENOUS ONCE AS NEEDED
Status: COMPLETED | OUTPATIENT
Start: 2025-02-26 | End: 2025-02-26

## 2025-02-26 RX ORDER — OXYTOCIN/0.9 % SODIUM CHLORIDE 30/500 ML
60 PLASTIC BAG, INJECTION (ML) INTRAVENOUS ONCE AS NEEDED
Status: DISCONTINUED | OUTPATIENT
Start: 2025-02-26 | End: 2025-02-27 | Stop reason: HOSPADM

## 2025-02-26 RX ORDER — HYDRALAZINE HYDROCHLORIDE 20 MG/ML
10 INJECTION INTRAMUSCULAR; INTRAVENOUS ONCE AS NEEDED
Status: DISCONTINUED | OUTPATIENT
Start: 2025-02-26 | End: 2025-02-27 | Stop reason: HOSPADM

## 2025-02-26 RX ORDER — NIFEDIPINE 30 MG/1
60 TABLET, FILM COATED, EXTENDED RELEASE ORAL 2 TIMES DAILY
Status: DISCONTINUED | OUTPATIENT
Start: 2025-02-27 | End: 2025-02-26

## 2025-02-26 RX ORDER — MORPHINE SULFATE 4 MG/ML
4 INJECTION, SOLUTION INTRAMUSCULAR; INTRAVENOUS
Status: DISCONTINUED | OUTPATIENT
Start: 2025-02-26 | End: 2025-02-27 | Stop reason: HOSPADM

## 2025-02-26 RX ORDER — IBUPROFEN 600 MG/1
600 TABLET ORAL EVERY 6 HOURS SCHEDULED
Status: DISCONTINUED | OUTPATIENT
Start: 2025-02-26 | End: 2025-02-27 | Stop reason: HOSPADM

## 2025-02-26 RX ORDER — OXYCODONE HYDROCHLORIDE 5 MG/1
5 TABLET ORAL EVERY 4 HOURS PRN
Status: DISCONTINUED | OUTPATIENT
Start: 2025-02-26 | End: 2025-02-27 | Stop reason: HOSPADM

## 2025-02-26 RX ORDER — HYDROMORPHONE HYDROCHLORIDE 1 MG/ML
0.2 INJECTION, SOLUTION INTRAMUSCULAR; INTRAVENOUS; SUBCUTANEOUS
Status: DISCONTINUED | OUTPATIENT
Start: 2025-02-26 | End: 2025-02-26

## 2025-02-26 RX ORDER — TRANEXAMIC ACID 100 MG/ML
1000 INJECTION, SOLUTION INTRAVENOUS ONCE AS NEEDED
Status: DISCONTINUED | OUTPATIENT
Start: 2025-02-26 | End: 2025-02-27 | Stop reason: HOSPADM

## 2025-02-26 RX ORDER — OXYTOCIN 10 [USP'U]/ML
10 INJECTION, SOLUTION INTRAMUSCULAR; INTRAVENOUS ONCE AS NEEDED
Status: DISCONTINUED | OUTPATIENT
Start: 2025-02-26 | End: 2025-02-27 | Stop reason: HOSPADM

## 2025-02-26 RX ORDER — DIPHENHYDRAMINE HCL 25 MG
25 CAPSULE ORAL NIGHTLY PRN
Status: DISCONTINUED | OUTPATIENT
Start: 2025-02-26 | End: 2025-02-27 | Stop reason: HOSPADM

## 2025-02-26 RX ORDER — NIFEDIPINE 30 MG/1
60 TABLET, FILM COATED, EXTENDED RELEASE ORAL EVERY 24 HOURS
Status: DISCONTINUED | OUTPATIENT
Start: 2025-02-26 | End: 2025-02-26

## 2025-02-26 RX ORDER — HYDRALAZINE HYDROCHLORIDE 20 MG/ML
5 INJECTION INTRAMUSCULAR; INTRAVENOUS ONCE AS NEEDED
Status: DISCONTINUED | OUTPATIENT
Start: 2025-02-26 | End: 2025-02-27 | Stop reason: HOSPADM

## 2025-02-26 RX ORDER — MISOPROSTOL 200 UG/1
800 TABLET ORAL ONCE AS NEEDED
Status: DISCONTINUED | OUTPATIENT
Start: 2025-02-26 | End: 2025-02-27 | Stop reason: HOSPADM

## 2025-02-26 RX ORDER — LIDOCAINE HYDROCHLORIDE 10 MG/ML
0.5 INJECTION, SOLUTION EPIDURAL; INFILTRATION; INTRACAUDAL; PERINEURAL ONCE AS NEEDED
Status: DISCONTINUED | OUTPATIENT
Start: 2025-02-26 | End: 2025-02-27 | Stop reason: HOSPADM

## 2025-02-26 RX ORDER — ENOXAPARIN SODIUM 100 MG/ML
40 INJECTION SUBCUTANEOUS EVERY 24 HOURS
Status: CANCELLED | OUTPATIENT
Start: 2025-02-27

## 2025-02-26 RX ORDER — LABETALOL HYDROCHLORIDE 5 MG/ML
80 INJECTION, SOLUTION INTRAVENOUS ONCE AS NEEDED
Status: DISCONTINUED | OUTPATIENT
Start: 2025-02-26 | End: 2025-02-27 | Stop reason: HOSPADM

## 2025-02-26 RX ORDER — POLYETHYLENE GLYCOL 3350 17 G/17G
17 POWDER, FOR SOLUTION ORAL 2 TIMES DAILY PRN
Status: DISCONTINUED | OUTPATIENT
Start: 2025-02-26 | End: 2025-02-27 | Stop reason: HOSPADM

## 2025-02-26 RX ORDER — ACETAMINOPHEN 325 MG/1
975 TABLET ORAL EVERY 6 HOURS SCHEDULED
Status: DISCONTINUED | OUTPATIENT
Start: 2025-02-26 | End: 2025-02-27 | Stop reason: HOSPADM

## 2025-02-26 RX ORDER — NIFEDIPINE 10 MG/1
10 CAPSULE ORAL ONCE AS NEEDED
Status: DISCONTINUED | OUTPATIENT
Start: 2025-02-26 | End: 2025-02-26

## 2025-02-26 RX ORDER — LOPERAMIDE HYDROCHLORIDE 2 MG/1
4 CAPSULE ORAL EVERY 2 HOUR PRN
Status: DISCONTINUED | OUTPATIENT
Start: 2025-02-26 | End: 2025-02-27 | Stop reason: HOSPADM

## 2025-02-26 RX ORDER — LABETALOL HYDROCHLORIDE 5 MG/ML
40 INJECTION, SOLUTION INTRAVENOUS ONCE AS NEEDED
Status: COMPLETED | OUTPATIENT
Start: 2025-02-26 | End: 2025-02-26

## 2025-02-26 RX ORDER — AMOXICILLIN 250 MG
2 CAPSULE ORAL NIGHTLY PRN
Status: DISCONTINUED | OUTPATIENT
Start: 2025-02-26 | End: 2025-02-27 | Stop reason: HOSPADM

## 2025-02-26 RX ORDER — NIFEDIPINE 30 MG/1
60 TABLET, FILM COATED, EXTENDED RELEASE ORAL 2 TIMES DAILY
Status: DISCONTINUED | OUTPATIENT
Start: 2025-02-26 | End: 2025-02-26

## 2025-02-26 RX ORDER — SODIUM CHLORIDE, SODIUM LACTATE, POTASSIUM CHLORIDE, CALCIUM CHLORIDE 600; 310; 30; 20 MG/100ML; MG/100ML; MG/100ML; MG/100ML
75 INJECTION, SOLUTION INTRAVENOUS CONTINUOUS
Status: DISCONTINUED | OUTPATIENT
Start: 2025-02-26 | End: 2025-02-27 | Stop reason: HOSPADM

## 2025-02-26 RX ORDER — LABETALOL HYDROCHLORIDE 5 MG/ML
40 INJECTION, SOLUTION INTRAVENOUS ONCE AS NEEDED
Status: DISCONTINUED | OUTPATIENT
Start: 2025-02-26 | End: 2025-02-27 | Stop reason: HOSPADM

## 2025-02-26 RX ORDER — CYCLOBENZAPRINE HCL 5 MG
5 TABLET ORAL 3 TIMES DAILY PRN
Status: DISCONTINUED | OUTPATIENT
Start: 2025-02-26 | End: 2025-02-27 | Stop reason: HOSPADM

## 2025-02-26 RX ORDER — CARBOPROST TROMETHAMINE 250 UG/ML
250 INJECTION, SOLUTION INTRAMUSCULAR ONCE AS NEEDED
Status: DISCONTINUED | OUTPATIENT
Start: 2025-02-26 | End: 2025-02-27 | Stop reason: HOSPADM

## 2025-02-26 RX ORDER — ADHESIVE BANDAGE
10 BANDAGE TOPICAL
Status: DISCONTINUED | OUTPATIENT
Start: 2025-02-26 | End: 2025-02-27 | Stop reason: HOSPADM

## 2025-02-26 RX ADMIN — LABETALOL HYDROCHLORIDE 40 MG: 5 INJECTION INTRAVENOUS at 17:25

## 2025-02-26 RX ADMIN — SODIUM CHLORIDE, POTASSIUM CHLORIDE, SODIUM LACTATE AND CALCIUM CHLORIDE 500 ML: 600; 310; 30; 20 INJECTION, SOLUTION INTRAVENOUS at 14:14

## 2025-02-26 RX ADMIN — SODIUM CHLORIDE, POTASSIUM CHLORIDE, SODIUM LACTATE AND CALCIUM CHLORIDE 75 ML/HR: 600; 310; 30; 20 INJECTION, SOLUTION INTRAVENOUS at 19:56

## 2025-02-26 RX ADMIN — NIFEDIPINE 60 MG: 30 TABLET, EXTENDED RELEASE ORAL at 17:35

## 2025-02-26 RX ADMIN — MORPHINE SULFATE 4 MG: 4 INJECTION, SOLUTION INTRAMUSCULAR; INTRAVENOUS at 16:44

## 2025-02-26 RX ADMIN — CYCLOBENZAPRINE HYDROCHLORIDE 5 MG: 5 TABLET, FILM COATED ORAL at 18:09

## 2025-02-26 RX ADMIN — ACETAMINOPHEN 975 MG: 325 TABLET ORAL at 19:56

## 2025-02-26 RX ADMIN — HYDROMORPHONE HYDROCHLORIDE 0.2 MG: 1 INJECTION, SOLUTION INTRAMUSCULAR; INTRAVENOUS; SUBCUTANEOUS at 14:19

## 2025-02-26 RX ADMIN — IBUPROFEN 600 MG: 600 TABLET, FILM COATED ORAL at 19:56

## 2025-02-26 RX ADMIN — SODIUM PHOSPHATE, DIBASIC AND SODIUM PHOSPHATE, MONOBASIC 1 ENEMA: 7; 19 ENEMA RECTAL at 18:09

## 2025-02-26 RX ADMIN — LABETALOL HYDROCHLORIDE 20 MG: 5 INJECTION INTRAVENOUS at 16:36

## 2025-02-26 SDOH — ECONOMIC STABILITY: HOUSING INSECURITY: DO YOU FEEL UNSAFE GOING BACK TO THE PLACE WHERE YOU ARE LIVING?: NO

## 2025-02-26 SDOH — HEALTH STABILITY: MENTAL HEALTH: WERE YOU ABLE TO COMPLETE ALL THE BEHAVIORAL HEALTH SCREENINGS?: NO

## 2025-02-26 SDOH — SOCIAL STABILITY: SOCIAL INSECURITY: HAVE YOU HAD ANY THOUGHTS OF HARMING ANYONE ELSE?: NO

## 2025-02-26 SDOH — SOCIAL STABILITY: SOCIAL INSECURITY: DOES ANYONE TRY TO KEEP YOU FROM HAVING/CONTACTING OTHER FRIENDS OR DOING THINGS OUTSIDE YOUR HOME?: NO

## 2025-02-26 SDOH — HEALTH STABILITY: MENTAL HEALTH: WISH TO BE DEAD (PAST 1 MONTH): NO

## 2025-02-26 SDOH — HEALTH STABILITY: MENTAL HEALTH: NON-SPECIFIC ACTIVE SUICIDAL THOUGHTS (PAST 1 MONTH): NO

## 2025-02-26 SDOH — SOCIAL STABILITY: SOCIAL INSECURITY: PHYSICAL ABUSE: DENIES

## 2025-02-26 SDOH — SOCIAL STABILITY: SOCIAL INSECURITY: DO YOU FEEL ANYONE HAS EXPLOITED OR TAKEN ADVANTAGE OF YOU FINANCIALLY OR OF YOUR PERSONAL PROPERTY?: NO

## 2025-02-26 SDOH — HEALTH STABILITY: MENTAL HEALTH: SUICIDAL BEHAVIOR (LIFETIME): NO

## 2025-02-26 SDOH — HEALTH STABILITY: MENTAL HEALTH: HAVE YOU USED ANY SUBSTANCES (CANABIS, COCAINE, HEROIN, HALLUCINOGENS, INHALANTS, ETC.) IN THE PAST 12 MONTHS?: NO

## 2025-02-26 SDOH — SOCIAL STABILITY: SOCIAL INSECURITY: HAS ANYONE EVER THREATENED TO HURT YOUR FAMILY OR YOUR PETS?: NO

## 2025-02-26 SDOH — SOCIAL STABILITY: SOCIAL INSECURITY: ARE THERE ANY APPARENT SIGNS OF INJURIES/BEHAVIORS THAT COULD BE RELATED TO ABUSE/NEGLECT?: NO

## 2025-02-26 SDOH — SOCIAL STABILITY: SOCIAL INSECURITY: ABUSE SCREEN: ADULT

## 2025-02-26 SDOH — HEALTH STABILITY: MENTAL HEALTH: HAVE YOU USED ANY PRESCRIPTION DRUGS OTHER THAN PRESCRIBED IN THE PAST 12 MONTHS?: NO

## 2025-02-26 SDOH — SOCIAL STABILITY: SOCIAL INSECURITY: HAVE YOU HAD THOUGHTS OF HARMING ANYONE ELSE?: NO

## 2025-02-26 SDOH — SOCIAL STABILITY: SOCIAL INSECURITY: VERBAL ABUSE: DENIES

## 2025-02-26 SDOH — SOCIAL STABILITY: SOCIAL INSECURITY: ARE YOU OR HAVE YOU BEEN THREATENED OR ABUSED PHYSICALLY, EMOTIONALLY, OR SEXUALLY BY ANYONE?: NO

## 2025-02-26 ASSESSMENT — PAIN SCALES - WONG BAKER: WONGBAKER_NUMERICALRESPONSE: HURTS EVEN MORE

## 2025-02-26 ASSESSMENT — PAIN SCALES - GENERAL
PAINLEVEL_OUTOF10: 8
PAINLEVEL_OUTOF10: 8
PAINLEVEL_OUTOF10: 5 - MODERATE PAIN
PAINLEVEL_OUTOF10: 8
PAINLEVEL_OUTOF10: 5 - MODERATE PAIN
PAINLEVEL_OUTOF10: 7

## 2025-02-26 ASSESSMENT — LIFESTYLE VARIABLES
SKIP TO QUESTIONS 9-10: 1
AUDIT-C TOTAL SCORE: 0
HOW OFTEN DO YOU HAVE 6 OR MORE DRINKS ON ONE OCCASION: NEVER
AUDIT-C TOTAL SCORE: 0
HOW OFTEN DO YOU HAVE A DRINK CONTAINING ALCOHOL: NEVER
HOW MANY STANDARD DRINKS CONTAINING ALCOHOL DO YOU HAVE ON A TYPICAL DAY: PATIENT DOES NOT DRINK

## 2025-02-26 ASSESSMENT — ENCOUNTER SYMPTOMS
OCCASIONAL FEELINGS OF UNSTEADINESS: 0
DEPRESSION: 0
LOSS OF SENSATION IN FEET: 0

## 2025-02-26 ASSESSMENT — PAIN DESCRIPTION - ORIENTATION: ORIENTATION: RIGHT

## 2025-02-26 ASSESSMENT — PATIENT HEALTH QUESTIONNAIRE - PHQ9
1. LITTLE INTEREST OR PLEASURE IN DOING THINGS: NOT AT ALL
2. FEELING DOWN, DEPRESSED OR HOPELESS: NOT AT ALL
SUM OF ALL RESPONSES TO PHQ9 QUESTIONS 1 & 2: 0

## 2025-02-26 ASSESSMENT — PAIN DESCRIPTION - LOCATION: LOCATION: BACK

## 2025-02-26 NOTE — PROGRESS NOTES
Postpartum Progress Note    Assessment/Plan   Karely Sharp is a 34 y.o., , who delivered at 37w0d gestation and is now postpartum day 16 from a PLTCS and POD #10 from a lap fred.    Right sided abdominal pain and back pain- CT scan c/w constipation- no acute process noted.   Fleets enema, flexeril- recheck in 1 hour    Nausea and vomiting-IVF, antiemetics    Elevated BP- IV labetalol 20 and 40mg. Will restart nifedipine 60mg when patient able to tolerate orals.    Assessment & Plan    Pregnancy Problems (from 24 to present)       Problem Noted Diagnosed Resolved    Chronic hypertension affecting pregnancy ([facility]) 2025 by Rex Hope MD  No    Priority:  Medium       Overview Addendum 2025 10:42 AM by Rex Hope MD     : Blood pressure high mild range in the setting of chronic hypertension.  Will start patient on oral nifedipine XL 30 mg daily.  Patient to obtain ambulatory CBC/CMP/UPC today   : labs reviewed and WNL; BP today WNL on nifed 30mg daily  : /91, current medications today.  Will increase nifedipine to 30 mg twice daily. IOL scheduled for 37 weeks.         36 weeks gestation of pregnancy ([facility]) 2024 by DOV Juarez-CNP  No    Priority:  Medium       Overview Addendum 2025  3:12 PM by Rex Hope MD     Desired provider in labor: [] CNM  [] Physician  [x] Blood Products: [x] Yes, accepts [] No, needs counseling  [x] Initial BMI: 41.28   [x] Prenatal Labs: Equivocal rubella  [x] Cervical Cancer Screening up to date: WNL  [x] Rh status: Rh+  [x] Genetic Screening: low risk XY  [x] NT US: (11-13 wks) late screen  [x] Baby ASA (if indicated): yes  [x] Pregnancy dated by: us er    [x] Anatomy US: (19-20 wks)    [] Federal Sterilization consent signed (if indicated):  [x] 1hr GCT at 24-28wks:   [x] Rhogam (if indicated): n/a  [x] Fetal Surveillance (if indicated): Weekly NSTs given chronic hypertension  [x] Tdap (27-32  wks, may be given up to 36 wks if initial window missed):   [] RSV (32-36 wks) (Sept. to end of Jan):   [] Flu Vaccine:    [] Breastfeeding:  [] Postpartum Birth control method:   [x] GBS at 36 - 37 wks: POS  [x] 39 weeks discussion of IOL vs. Expectant management: N/A  [x] Mode of delivery ( anticipated ): IOL at 37 weeks                    Subjective   Pt states she feels a little better after IV fluids.      Objective   Allergies:   Patient has no known allergies.         Last Vitals:  Temp Pulse Resp BP MAP Pulse Ox   36.2 °C (97.2 °F) 89 18 (!) 151/79 108 98 %     Vitals Min/Max Last 24 Hours:  Temp  Min: 36.2 °C (97.2 °F)  Max: 36.2 °C (97.2 °F)  Pulse  Min: 63  Max: 89  Resp  Min: 18  Max: 18  BP  Min: 148/75  Max: 194/105  MAP (mmHg)  Min: 105  Max: 137    Intake/Output:   No intake or output data in the 24 hours ending 02/26/25 1812    Physical Exam:  General: Examination reveals a well developed, well nourished, female, whose affect is appropriate. She is alert and cooperative.  Lungs: clear to auscultation bilaterally.  Abdomen: mildly tender to palpation, decreased bowel sounds.  Incision: healing well.  Fundus: firm and nontender.  Extremities: no redness or tenderness in the calves or thighs, no edema.      Lab Data:  Lab Results   Component Value Date    WBC 13.2 (H) 02/26/2025    HGB 12.8 02/26/2025    HCT 39.4 02/26/2025     02/26/2025     Lab Results   Component Value Date    GLUCOSE 93 02/26/2025     02/26/2025    K 3.4 (L) 02/26/2025     02/26/2025    CO2 28 02/26/2025    ANIONGAP 12 02/26/2025    BUN 10 02/26/2025    CREATININE 0.67 02/26/2025    EGFR >90 02/26/2025    CALCIUM 9.1 02/26/2025    ALBUMIN 3.9 02/26/2025    PROT 7.0 02/26/2025    ALKPHOS 137 (H) 02/26/2025    ALT 22 02/26/2025    AST 15 02/26/2025    BILITOT 0.4 02/26/2025

## 2025-02-26 NOTE — PROGRESS NOTES
Subjective   Patient ID: Karely Sharp is a 34 y.o. female who presents for Postpartum Follow-up (Pt is here for PPV Follow up for her incision check. Pt states that she is in a lot of pain in her lower back. Pt states that she has not had a bowel movement in a week./Pain is a 8 in lower back/No fall/Pt declined chaperone/[facility]).  Patient here for incision check.  Patient has  at [facility] followed by freddy ibarra at [facility] several days later.  Since her lap fred she has been having daily nausea and vomiting so she has not been taking her blood pressure medication.  She also has not been breast-feeding as she was told by the general surgery team she could not breast-feed while narcotic medications.  She denies headache blurry vision chest pain shortness of breath.  She also reports constipation and back pain.        Review of Systems   All other systems reviewed and are negative.      Objective   Physical Exam  Vitals reviewed.   Constitutional:       General: She is not in acute distress.     Appearance: She is not ill-appearing.   Pulmonary:      Effort: Pulmonary effort is normal.   Abdominal:      General: A surgical scar is present. There is no distension.      Palpations: Abdomen is soft.      Tenderness: There is no abdominal tenderness.      Hernia: No hernia is present.      Comments: Approximated low transverse incision with no evidence of infection or wound disruption.   Skin:     Coloration: Skin is not pale.   Neurological:      Mental Status: She is alert.         Assessment/Plan   Diagnoses and all orders for this visit:  Postpartum hypertension ([facility]-H[facility])  Postoperative visit  Encounter for postpartum visit  Post-operative nausea and vomiting  Constipation, unspecified constipation type    Patient here for postpartum visit.  Patient with severe range blood pressure on intake at 158/111.  Also with daily nausea and vomiting since freddy ibarra.   incision is healing well.  Patient has been  having constipation and back pain as well.  Given this constellation of symptoms patient will need to be ruled out for preeclampsia and postoperative complications.  Contacted Dr. Haskins and Mita Kimble CNM at [facility] coordinate a higher level of care.  Charge nurse notified as well.  Patient to present to [facility] labor and delivery triage.       Rex Hope MD 02/26/25 3:43 PM

## 2025-02-26 NOTE — H&P
OB Triage H&P    Assessment/Plan    Karely Sharp is a 34 y.o.  at 37w0d, PRADEEP: 3/3/2025, by Ultrasound, who presents to triage with right back pain, nausea/vomiting, constipation and elevated blood pressure.    Plan  IVF, labs, CT, pain control      Dispo  Pending results        Pregnancy Problems (from 24 to present)       Problem Noted Diagnosed Resolved    Chronic hypertension affecting pregnancy ([facility]) 2025 by Rex Hope MD  No    Priority:  Medium       Overview Addendum 2025 10:42 AM by Rex Hope MD     : Blood pressure high mild range in the setting of chronic hypertension.  Will start patient on oral nifedipine XL 30 mg daily.  Patient to obtain ambulatory CBC/CMP/UPC today   : labs reviewed and WNL; BP today WNL on nifed 30mg daily  : /91, current medications today.  Will increase nifedipine to 30 mg twice daily. IOL scheduled for 37 weeks.         36 weeks gestation of pregnancy ([facility]) 2024 by Lillian Montague, APRN-CNP  No    Priority:  Medium       Overview Addendum 2025  3:12 PM by Rex Hope MD     Desired provider in labor: [] CNM  [] Physician  [x] Blood Products: [x] Yes, accepts [] No, needs counseling  [x] Initial BMI: 41.28   [x] Prenatal Labs: Equivocal rubella  [x] Cervical Cancer Screening up to date: WNL  [x] Rh status: Rh+  [x] Genetic Screening: low risk XY  [x] NT US: (11-13 wks) late screen  [x] Baby ASA (if indicated): yes  [x] Pregnancy dated by: us er    [x] Anatomy US: (19-20 wks)    [] Federal Sterilization consent signed (if indicated):  [x] 1hr GCT at 24-28wks:   [x] Rhogam (if indicated): n/a  [x] Fetal Surveillance (if indicated): Weekly NSTs given chronic hypertension  [x] Tdap (27-32 wks, may be given up to 36 wks if initial window missed):   [] RSV (32-36 wks) (Sept. to end of ):   [] Flu Vaccine:    [] Breastfeeding:  [] Postpartum Birth control method:   [x] GBS at 36 - 37 wks:  POS  [x] 39 weeks discussion of IOL vs. Expectant management: N/A  [x] Mode of delivery ( anticipated ): IOL at 37 weeks                   Subjective     Pt presents after seeing OB provider for a postop visit. Her BP was elevated as she has been too nauseated to take her nifedipine. She is 16 days post C/S and 10 days post lap fred. Has not been keeping fluids down, has not had a bowel movement since lap fred. Complaining of 8/10 right sided back pain that is intermittent. Bleeding is minimal, no incisional pain. Denies fevers, chills. Takes ibuprofen for pain.    Prenatal Provider Jayy    OB History    Para Term  AB Living   5 2 2 0 3 2   SAB IAB Ectopic Multiple Live Births   0 3 0 0 2      # Outcome Date GA Lbr Bret/2nd Weight Sex Type Anes PTL Lv   5 Term 02/10/25 37w0d  2.66 kg M CS-LTranv EPI  SHANAE      Complications: Failure to Progress in First Stage      Name: Bobby Cotton      Apgar1: 1  Apgar5: 4   4 Term 13    M Vag-Spont   SHANAE   3 IAB            2 IAB            1 IAB               Obstetric Comments   Patient reports having to do some type of 24-hour urine test with her pregnancy in .  Does not remember having a diagnosis of preeclampsia.       Past Surgical History:   Procedure Laterality Date     SECTION, LOW TRANSVERSE  02/10/2025    CHOLECYSTECTOMY  2025    NECK SURGERY      lymph node removal       Social History     Tobacco Use    Smoking status: Never    Smokeless tobacco: Never   Substance Use Topics    Alcohol use: Not Currently       No Known Allergies    Medications Prior to Admission   Medication Sig Dispense Refill Last Dose/Taking    ibuprofen 600 mg tablet Take 1 tablet (600 mg) by mouth every 6 hours. 120 tablet 0 2025 Evening    NIFEdipine ER (Adalat CC) 60 mg 24 hr tablet Take 1 tablet (60 mg) by mouth 2 times a day. Do not crush, chew, or split. 60 tablet 3 Past Week    acetaminophen (Tylenol) 325 mg tablet Take 3 tablets (975 mg) by mouth  every 6 hours. 360 tablet 0 Unknown    naloxone (Narcan) 4 mg/0.1 mL nasal spray Administer 1 spray (4 mg) into affected nostril(s) if needed for opioid reversal or respiratory depression. May repeat every 2-3 minutes if needed, alternating nostrils, until medical assistance becomes available. 2 each 11     norethindrone (Micronor) 0.35 mg tablet Take 1 tablet (0.35 mg) over 28 days by mouth once daily. 28 tablet 12 Unknown    polyethylene glycol (Glycolax, Miralax) 17 gram packet Take 17 g by mouth once daily. 30 packet 0 Unknown    prenatal vitamin calcium-iron-folic 27 mg iron- 1 mg tablet Take 1 tablet by mouth once daily. 90 tablet 3 Unknown    [] sennosides (Senokot) 8.6 mg tablet Take 1 tablet (8.6 mg) by mouth 2 times a day for 5 days. 10 tablet 0     simethicone (Mylicon) 80 mg chewable tablet Chew 1 tablet (80 mg) every 6 hours if needed for flatulence. 30 tablet 0 Unknown     Objective     Last Vitals  Temp Pulse Resp BP MAP O2 Sat   36.2 °C (97.2 °F) 71 18 (!) 165/91 122 100 %     Blood Pressures         2025  1310 2025  1312          BP: 165/91 165/91               Physical Exam  General: NAD, mood appropriate, appears uncomfortable  Cardiopulmonary: warm and well perfused, breathing comfortably on room air  Abdomen:  non-tender, minimally tender to palpation  Back- right flank tender to palpation.  Extremities: Symmetric    Chaperone Present: Yes.  Chaperone Name/Title: Sonja  Examination Chaperoned: Entire Physical Exam       Labs in chart were reviewed.   Results from last 7 days   Lab Units 25  1413   WBC AUTO x10*3/uL 13.2*   HEMOGLOBIN g/dL 12.8   HEMATOCRIT % 39.4   PLATELETS AUTO x10*3/uL 409

## 2025-02-26 NOTE — SIGNIFICANT EVENT
While Dr. Haskins unavailble and in OR with urgent section, patient had two severe range Bps 15 munutes apart. Labetalol given IV per protocol.   OB hypertensive orders set placed per protocol. Dr. Haskins notified of change in status.

## 2025-02-27 VITALS
RESPIRATION RATE: 16 BRPM | WEIGHT: 219.8 LBS | DIASTOLIC BLOOD PRESSURE: 79 MMHG | HEART RATE: 77 BPM | SYSTOLIC BLOOD PRESSURE: 130 MMHG | BODY MASS INDEX: 38.95 KG/M2 | TEMPERATURE: 97.3 F | HEIGHT: 63 IN | OXYGEN SATURATION: 99 %

## 2025-02-27 PROCEDURE — 2500000001 HC RX 250 WO HCPCS SELF ADMINISTERED DRUGS (ALT 637 FOR MEDICARE OP): Performed by: STUDENT IN AN ORGANIZED HEALTH CARE EDUCATION/TRAINING PROGRAM

## 2025-02-27 PROCEDURE — 2500000002 HC RX 250 W HCPCS SELF ADMINISTERED DRUGS (ALT 637 FOR MEDICARE OP, ALT 636 FOR OP/ED): Performed by: STUDENT IN AN ORGANIZED HEALTH CARE EDUCATION/TRAINING PROGRAM

## 2025-02-27 RX ORDER — SENNOSIDES 8.6 MG/1
1 TABLET ORAL 2 TIMES DAILY
Qty: 60 TABLET | Refills: 0 | Status: SHIPPED | OUTPATIENT
Start: 2025-02-27

## 2025-02-27 RX ADMIN — ACETAMINOPHEN 975 MG: 325 TABLET ORAL at 09:00

## 2025-02-27 RX ADMIN — IBUPROFEN 600 MG: 600 TABLET, FILM COATED ORAL at 09:00

## 2025-02-27 RX ADMIN — NIFEDIPINE 60 MG: 30 TABLET, EXTENDED RELEASE ORAL at 09:00

## 2025-02-27 RX ADMIN — ACETAMINOPHEN 975 MG: 325 TABLET ORAL at 02:29

## 2025-02-27 RX ADMIN — IBUPROFEN 600 MG: 600 TABLET, FILM COATED ORAL at 02:29

## 2025-02-27 SDOH — SOCIAL STABILITY: SOCIAL INSECURITY: WITHIN THE LAST YEAR, HAVE YOU BEEN HUMILIATED OR EMOTIONALLY ABUSED IN OTHER WAYS BY YOUR PARTNER OR EX-PARTNER?: NO

## 2025-02-27 SDOH — ECONOMIC STABILITY: FOOD INSECURITY: WITHIN THE PAST 12 MONTHS, YOU WORRIED THAT YOUR FOOD WOULD RUN OUT BEFORE YOU GOT THE MONEY TO BUY MORE.: NEVER TRUE

## 2025-02-27 SDOH — SOCIAL STABILITY: SOCIAL INSECURITY: WITHIN THE LAST YEAR, HAVE YOU BEEN AFRAID OF YOUR PARTNER OR EX-PARTNER?: NO

## 2025-02-27 SDOH — ECONOMIC STABILITY: FOOD INSECURITY: WITHIN THE PAST 12 MONTHS, THE FOOD YOU BOUGHT JUST DIDN'T LAST AND YOU DIDN'T HAVE MONEY TO GET MORE.: NEVER TRUE

## 2025-02-27 ASSESSMENT — PAIN SCALES - GENERAL
PAINLEVEL_OUTOF10: 0 - NO PAIN
PAINLEVEL_OUTOF10: 0 - NO PAIN

## 2025-02-27 ASSESSMENT — ACTIVITIES OF DAILY LIVING (ADL): LACK_OF_TRANSPORTATION: NO

## 2025-02-27 NOTE — SIGNIFICANT EVENT
Pt with interval improvement in abdominal pain s/p passage of small amount of stool.  Will defer general surgery consultation at this time.    Patient with multiple severe range blood pressures has received labetalol 20 mg and 40 mg per protocol.  Nifedipine extended release 60 mg twice daily has been restarted.  Next dose will be in the morning.  Will admit patient for observation overnight.  Preeclampsia labs within normal limits.  Suspect that this presentation is consistent with uncontrolled chronic hypertension rather than postpartum preeclampsia.

## 2025-02-27 NOTE — DISCHARGE SUMMARY
Discharge Summary    Admission Date: 2025  Discharge Date: 2025    Discharge Diagnosis  Hypertension in pregnancy, preeclampsia, severe, delivered/postpartum ([facility])    Hospital Course  Delivery Date: 2/10/2025 4:15 PM  Delivery type: , Low Transverse   GA at delivery: 37w0d  Outcome: Living  Anesthesia during delivery: Epidural  Intrapartum complications: Failure to Progress in First Stage  Feeding method:       Procedures:  none  Contraception at discharge: oral contraceptives      Pertinent Physical Exam At Time of Discharge    General: Examination reveals a well developed, well nourished, female, in no acute distress. She is alert and cooperative.  Lungs: clear to auscultation bilaterally.  Cardiac: regular rate and rhythm, S1, S2 normal, no murmur, click, rub or gallop.  Incision: healing well.  Fundus: firm and below umbilicus.  Extremities: no redness or tenderness in the calves or thighs, no edema.  Neurological: alert, oriented, normal speech, no focal findings or movement disorder noted.  Psychological: awake and alert; oriented to person, place, and time.    Last Vitals:  Temp Pulse Resp BP MAP Pulse Ox   36.2 °C (97.2 °F) 80 18 117/68 87 99 %     Discharge Meds     Your medication list        CONTINUE taking these medications        Instructions Last Dose Given Next Dose Due   acetaminophen 325 mg tablet  Commonly known as: Tylenol      Take 3 tablets (975 mg) by mouth every 6 hours.       ibuprofen 600 mg tablet      Take 1 tablet (600 mg) by mouth every 6 hours.       naloxone 4 mg/0.1 mL nasal spray  Commonly known as: Narcan      Administer 1 spray (4 mg) into affected nostril(s) if needed for opioid reversal or respiratory depression. May repeat every 2-3 minutes if needed, alternating nostrils, until medical assistance becomes available.       NIFEdipine ER 60 mg 24 hr tablet  Commonly known as: Adalat CC      Take 1 tablet (60 mg) by mouth 2 times a day. Do not crush, chew, or  split.       norethindrone 0.35 mg tablet  Commonly known as: Micronor      Take 1 tablet (0.35 mg) over 28 days by mouth once daily.       polyethylene glycol 17 gram packet  Commonly known as: Glycolax, Miralax      Take 17 g by mouth once daily.       prenatal vitamin calcium-iron-folic 27 mg iron- 1 mg tablet      Take 1 tablet by mouth once daily.       simethicone 80 mg chewable tablet  Commonly known as: Mylicon      Chew 1 tablet (80 mg) every 6 hours if needed for flatulence.              ASK your doctor about these medications        Instructions Last Dose Given Next Dose Due   sennosides 8.6 mg tablet  Commonly known as: Senokot  Ask about: Should I take this medication?      Take 1 tablet (8.6 mg) by mouth 2 times a day for 5 days.                 Complications Requiring Follow-Up  Postop and BP check within 1 week    Test Results Pending At Discharge  Pending Labs       No current pending labs.            Outpatient Follow-Up  Future Appointments   Date Time Provider Department Center   3/26/2025 10:15 AM Rex Hope MD [id number]           Poly Haskins DO

## 2025-02-27 NOTE — DISCHARGE INSTRUCTIONS
Call to schedule a follow up appointment  Return to triage with any worsening symptoms, fever, heavy bleeding.  Continue Miralax as needed.

## 2025-03-26 ENCOUNTER — APPOINTMENT (OUTPATIENT)
Dept: OBSTETRICS AND GYNECOLOGY | Facility: CLINIC | Age: 35
End: 2025-03-26
Payer: MEDICAID

## 2025-03-26 VITALS
SYSTOLIC BLOOD PRESSURE: 131 MMHG | WEIGHT: 242 LBS | DIASTOLIC BLOOD PRESSURE: 87 MMHG | HEIGHT: 63 IN | BODY MASS INDEX: 42.88 KG/M2

## 2025-03-26 DIAGNOSIS — Z48.89 POSTOPERATIVE VISIT: Primary | ICD-10-CM

## 2025-03-26 PROBLEM — Z3A.36 36 WEEKS GESTATION OF PREGNANCY (HHS-HCC): Status: RESOLVED | Noted: 2024-09-16 | Resolved: 2025-02-10

## 2025-03-26 PROBLEM — O10.919 CHRONIC HYPERTENSION AFFECTING PREGNANCY (HHS-HCC): Status: RESOLVED | Noted: 2025-01-02 | Resolved: 2025-02-10

## 2025-03-26 ASSESSMENT — PAIN SCALES - GENERAL: PAINLEVEL_OUTOF10: 0-NO PAIN

## 2025-03-26 ASSESSMENT — EDINBURGH POSTNATAL DEPRESSION SCALE (EPDS)
THINGS HAVE BEEN GETTING ON TOP OF ME: NO, I HAVE BEEN COPING AS WELL AS EVER
I HAVE FELT SAD OR MISERABLE: NO, NOT AT ALL
I HAVE BLAMED MYSELF UNNECESSARILY WHEN THINGS WENT WRONG: NOT VERY OFTEN
I HAVE BEEN SO UNHAPPY THAT I HAVE HAD DIFFICULTY SLEEPING: NOT AT ALL
I HAVE BEEN ANXIOUS OR WORRIED FOR NO GOOD REASON: YES, SOMETIMES
TOTAL SCORE: 3
I HAVE FELT SCARED OR PANICKY FOR NO GOOD REASON: NO, NOT AT ALL
THE THOUGHT OF HARMING MYSELF HAS OCCURRED TO ME: NEVER
I HAVE LOOKED FORWARD WITH ENJOYMENT TO THINGS: AS MUCH AS I EVER DID
I HAVE BEEN ABLE TO LAUGH AND SEE THE FUNNY SIDE OF THINGS: AS MUCH AS I ALWAYS COULD
I HAVE BEEN SO UNHAPPY THAT I HAVE BEEN CRYING: NO, NEVER

## 2025-03-26 ASSESSMENT — PATIENT HEALTH QUESTIONNAIRE - PHQ9
1. LITTLE INTEREST OR PLEASURE IN DOING THINGS: NOT AT ALL
SUM OF ALL RESPONSES TO PHQ9 QUESTIONS 1 AND 2: 0
2. FEELING DOWN, DEPRESSED OR HOPELESS: NOT AT ALL

## 2025-03-26 ASSESSMENT — ENCOUNTER SYMPTOMS
DEPRESSION: 0
OCCASIONAL FEELINGS OF UNSTEADINESS: 0
LOSS OF SENSATION IN FEET: 0

## 2025-03-26 NOTE — PROGRESS NOTES
Subjective   Patient ID: Karely Sharp is a 34 y.o. female who presents for Postpartum Care (Pt is here for her Postpartum visit. Pt has concerns or issue at this time./No pain/No fall/Pt declined chaperone./[facility] RIBEIRO).  HPI    Review of Systems   All other systems reviewed and are negative.      Objective   Physical Exam  Vitals reviewed.   Constitutional:       General: She is not in acute distress.     Appearance: She is not ill-appearing.   Pulmonary:      Effort: Pulmonary effort is normal.   Abdominal:      General: A surgical scar is present. There is no distension.      Palpations: Abdomen is soft.      Tenderness: There is no abdominal tenderness.      Hernia: No hernia is present.      Comments: Well-healing low transverse incision   Skin:     Coloration: Skin is not pale.   Neurological:      Mental Status: She is alert.         Assessment/Plan   Diagnoses and all orders for this visit:  Postoperative visit  Encounter for postpartum visit  Contraceptive education    Patient here for post partum visit.  Blood pressure is in good control incision is healing well no symptoms of preeclampsia not breast-feeding has contraception.  Complaints.  Will patient follow-up in 3 months for annual visit.       Rex Hope MD 03/26/25 3:42 PM

## 2025-06-27 ENCOUNTER — APPOINTMENT (OUTPATIENT)
Dept: OBSTETRICS AND GYNECOLOGY | Facility: CLINIC | Age: 35
End: 2025-06-27
Payer: MEDICAID

## 2025-07-10 ENCOUNTER — APPOINTMENT (OUTPATIENT)
Dept: OBSTETRICS AND GYNECOLOGY | Facility: CLINIC | Age: 35
End: 2025-07-10
Payer: MEDICAID

## (undated) DEVICE — SUTURE, MONOCRYL, 4-0, 18 IN, PS2, UNDYED

## (undated) DEVICE — SUTURE, VICRYL PLUS, 0, 27IN, UR-6, VIOLET, BRAIDED

## (undated) DEVICE — DRAPE, C-ARM, W/12 IN COVER, LI XTRAY TUBE

## (undated) DEVICE — TOWEL, SURGICAL, NEURO, O/R, 16 X 26, BLUE, STERILE

## (undated) DEVICE — ADHESIVE, SKIN, DERMABOND ADVANCED, 15CM, PEN-STYLE

## (undated) DEVICE — SUTURE, MONOCRYL, 2-0, 36 IN, CTX, VIOLET

## (undated) DEVICE — SHEAR, W/UNIPOLAR CAUTERY, ENDOSHEAR, 5 MM

## (undated) DEVICE — SUTURE, PDS II, 0, 60 IN, CTX, VIOLET

## (undated) DEVICE — SUTURE, VICRYL, 0, 36 IN, CT, UNDYED

## (undated) DEVICE — CANNULA, KII ADVANCED FIXATION, 5X100MM W/SEAL

## (undated) DEVICE — CATHETER, IV, ANGIOCATH, 14 G X 5.25 IN, FEP POLYMER

## (undated) DEVICE — CORD, MONOPOLAR HIGH FREQUENCY, 8MM PLUG, 300CM

## (undated) DEVICE — RETRIEVAL SYSTEM, MONARCH, 10MM DISP ENDOSCOPIC

## (undated) DEVICE — CLIP, LIGATING, HEM-O-LOCK, MEDIUM/LARGE, LF, GREEN

## (undated) DEVICE — DRAPE PACK, CESAREAN SECTION, CUSTOM, UHC

## (undated) DEVICE — TROCAR SYSTEM, BALLOON, KII GELPORT, 12 X 100MM

## (undated) DEVICE — Device

## (undated) DEVICE — TUBE SET, PNEUMOLAR HEATED, SMOKE EVACU, HIGH-FLOW

## (undated) DEVICE — SCOPE WARMER, LAPAROSCOPE, BAG ONLY, LF

## (undated) DEVICE — SUTURE, VICRYL, 0, 27 IN, UR-6, VIOLET

## (undated) DEVICE — ELECTRODE, LAPAROSCOPY, L HOOK, 33CM, STERILE

## (undated) DEVICE — CATHETER, CHOLANGIOGRAM, 18 G X 11

## (undated) DEVICE — CLIP, ENDO, CLINCH II, W/RATCHET, ON/OFF, CLINCH II, 5 MM

## (undated) DEVICE — PUMP, STRYKERFLOW 2 & HANDPIECE W/10FT. IRRIGATION TUBING

## (undated) DEVICE — GLOVE, SURGICAL, PROTEXIS PI ORTHO, 7.0, PF, LF

## (undated) DEVICE — TOWEL PACK, STERILE, 4/PACK, BLUE

## (undated) DEVICE — SUTURE, MONOCRYL, 4-0, 27 IN, PS-2, UNDYED

## (undated) DEVICE — GLOVE, SURGICAL, PROTEXIS PI MICRO, 6.5, PF, LF

## (undated) DEVICE — GLOVE, SURGICAL, PROTEXIS PI BLUE W/NEUTHERA, 6.5, PF, LF

## (undated) DEVICE — SYSTEM, TROCAR LAP, 5X100MM, SHIELD BLADED, KII ADVANCED FIX ABDOMINAL